# Patient Record
Sex: FEMALE | Race: WHITE | Employment: PART TIME | URBAN - METROPOLITAN AREA
[De-identification: names, ages, dates, MRNs, and addresses within clinical notes are randomized per-mention and may not be internally consistent; named-entity substitution may affect disease eponyms.]

---

## 2017-03-31 ENCOUNTER — OFFICE VISIT (OUTPATIENT)
Dept: INTERNAL MEDICINE | Age: 22
End: 2017-03-31
Payer: MEDICAID

## 2017-03-31 VITALS
DIASTOLIC BLOOD PRESSURE: 78 MMHG | SYSTOLIC BLOOD PRESSURE: 114 MMHG | WEIGHT: 138 LBS | BODY MASS INDEX: 26.07 KG/M2 | OXYGEN SATURATION: 100 % | HEART RATE: 92 BPM

## 2017-03-31 DIAGNOSIS — M25.842 CYST OF JOINT OF HAND, LEFT: Primary | ICD-10-CM

## 2017-03-31 DIAGNOSIS — Z30.09 BIRTH CONTROL COUNSELING: ICD-10-CM

## 2017-03-31 DIAGNOSIS — G44.019 EPISODIC CLUSTER HEADACHE, NOT INTRACTABLE: ICD-10-CM

## 2017-03-31 DIAGNOSIS — Z00.00 HEALTHCARE MAINTENANCE: ICD-10-CM

## 2017-03-31 PROCEDURE — 99205 OFFICE O/P NEW HI 60 MIN: CPT | Performed by: NURSE PRACTITIONER

## 2017-03-31 RX ORDER — ACETAMINOPHEN, ASPIRIN AND CAFFEINE 250; 250; 65 MG/1; MG/1; MG/1
2 TABLET, FILM COATED ORAL EVERY 6 HOURS PRN
Qty: 90 TABLET | Refills: 3 | COMMUNITY
Start: 2017-03-31 | End: 2017-11-16

## 2017-03-31 ASSESSMENT — ENCOUNTER SYMPTOMS
COUGH: 0
NAUSEA: 1
SINUS PRESSURE: 1
PHOTOPHOBIA: 1

## 2017-03-31 ASSESSMENT — PATIENT HEALTH QUESTIONNAIRE - PHQ9
1. LITTLE INTEREST OR PLEASURE IN DOING THINGS: 0
SUM OF ALL RESPONSES TO PHQ QUESTIONS 1-9: 0
2. FEELING DOWN, DEPRESSED OR HOPELESS: 0
SUM OF ALL RESPONSES TO PHQ9 QUESTIONS 1 & 2: 0

## 2017-11-16 ENCOUNTER — OFFICE VISIT (OUTPATIENT)
Dept: FAMILY MEDICINE CLINIC | Age: 22
End: 2017-11-16
Payer: MEDICAID

## 2017-11-16 VITALS
RESPIRATION RATE: 14 BRPM | HEART RATE: 87 BPM | WEIGHT: 146 LBS | BODY MASS INDEX: 27.56 KG/M2 | HEIGHT: 61 IN | SYSTOLIC BLOOD PRESSURE: 118 MMHG | OXYGEN SATURATION: 94 % | DIASTOLIC BLOOD PRESSURE: 82 MMHG

## 2017-11-16 DIAGNOSIS — Z76.89 ENCOUNTER TO ESTABLISH CARE: Primary | ICD-10-CM

## 2017-11-16 DIAGNOSIS — R22.2 LUMP OF SKIN OF BACK: ICD-10-CM

## 2017-11-16 DIAGNOSIS — G89.29 CHRONIC MIDLINE LOW BACK PAIN WITHOUT SCIATICA: ICD-10-CM

## 2017-11-16 DIAGNOSIS — M25.562 PAIN IN LATERAL PORTION OF LEFT KNEE: ICD-10-CM

## 2017-11-16 DIAGNOSIS — M54.50 CHRONIC MIDLINE LOW BACK PAIN WITHOUT SCIATICA: ICD-10-CM

## 2017-11-16 LAB
CONTROL: NEGATIVE
PREGNANCY TEST URINE, POC: NEGATIVE

## 2017-11-16 PROCEDURE — 1036F TOBACCO NON-USER: CPT | Performed by: FAMILY MEDICINE

## 2017-11-16 PROCEDURE — G8419 CALC BMI OUT NRM PARAM NOF/U: HCPCS | Performed by: FAMILY MEDICINE

## 2017-11-16 PROCEDURE — G8427 DOCREV CUR MEDS BY ELIG CLIN: HCPCS | Performed by: FAMILY MEDICINE

## 2017-11-16 PROCEDURE — 99204 OFFICE O/P NEW MOD 45 MIN: CPT | Performed by: FAMILY MEDICINE

## 2017-11-16 PROCEDURE — 81025 URINE PREGNANCY TEST: CPT | Performed by: FAMILY MEDICINE

## 2017-11-16 PROCEDURE — G8484 FLU IMMUNIZE NO ADMIN: HCPCS | Performed by: FAMILY MEDICINE

## 2017-11-16 RX ORDER — NAPROXEN 500 MG/1
500 TABLET ORAL 2 TIMES DAILY PRN
Qty: 60 TABLET | Refills: 0 | Status: ON HOLD | OUTPATIENT
Start: 2017-11-16 | End: 2018-02-16 | Stop reason: HOSPADM

## 2017-11-16 ASSESSMENT — ENCOUNTER SYMPTOMS
BACK PAIN: 1
SHORTNESS OF BREATH: 0
BLOOD IN STOOL: 0
EYE PAIN: 0

## 2017-11-16 NOTE — PROGRESS NOTES
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    History of frequent ear infections as a child    Vision abnormalities      Past Surgical History:   Procedure Laterality Date    CARDIAC SURGERY      hole in heart at birth   [place]  SECTION      2x    DENTAL SURGERY      wisdom teeth     Family History   Problem Relation Age of Onset    Breast Cancer Other     Hypertension Maternal Grandmother     Cancer Maternal Grandmother      tongue    Diabetes Mother     Hypertension Mother     Breast Cancer Maternal Aunt     Heart Disease Son     Colon Cancer Neg Hx     Eclampsia Neg Hx     Ovarian Cancer Neg Hx      Labor Neg Hx     Spont Abortions Neg Hx     Stroke Neg Hx      Social History   Substance Use Topics    Smoking status: Never Smoker    Smokeless tobacco: Never Used    Alcohol use No       Current Outpatient Prescriptions:     naproxen (EC-NAPROSYN) 500 MG EC tablet, Take 1 tablet by mouth 2 times daily as needed for Pain, Disp: 60 tablet, Rfl: 0    Subjective:     Review of Systems   Constitutional: Negative for appetite change, diaphoresis, fever and unexpected weight change. HENT: Negative for ear pain. Eyes: Negative for pain. Respiratory: Negative for shortness of breath. Cardiovascular: Negative for chest pain and leg swelling. Gastrointestinal: Negative for blood in stool. Musculoskeletal: Positive for arthralgias and back pain. Psychiatric/Behavioral: Negative for dysphoric mood and suicidal ideas. Objective:     /82   Pulse 87   Resp 14   Ht 5' 1\" (1.549 m)   Wt 146 lb (66.2 kg)   LMP 10/28/2017   SpO2 94%   Breastfeeding? No   BMI 27.59 kg/m²     Physical Exam   Constitutional: She is oriented to person, place, and time. She appears well-developed.  No distress. HENT:   Head: Normocephalic. Mouth/Throat: Oropharynx is clear and moist.   Eyes: Conjunctivae and EOM are normal.   Neck: Normal range of motion. Neck supple. No thyromegaly present. Cardiovascular: Normal rate, regular rhythm and normal heart sounds. No murmur heard. Pulmonary/Chest: Effort normal and breath sounds normal. No respiratory distress. She has no wheezes. Abdominal: Soft. There is no rebound and no guarding. Musculoskeletal: She exhibits no edema or deformity. Left knee: She exhibits abnormal patellar mobility. She exhibits normal range of motion, no swelling, no effusion, no LCL laxity, no bony tenderness and no MCL laxity. Tenderness on palpation over lateral patella, patella hypermobile   Lymphadenopathy:     She has no cervical adenopathy. Neurological: She is alert and oriented to person, place, and time. Skin: Lesion noted. She is not diaphoretic. R lower back: 2 cm x 3 cm soft, non-tender, non-mobile subcutaneous lump, no overlying skin changes   Psychiatric: She has a normal mood and affect. Her behavior is normal. Judgment and thought content normal.       Assessment & Plan:      1. Encounter to establish care  - CBC Auto Differential; Future  - Comprehensive Metabolic Panel; Future  - Ferritin; Future  - Lipid Panel; Future  - TSH with Reflex; Future  - Vitamin B12; Future  - HCG, Quantitative, Pregnancy; Future    2. Lump of skin of back  The lump on her right lower back seems to be related to a cyst or lipoma. Given the patient's concern and ultrasound and x-ray have been ordered. - US ABDOMEN LIMITED; Future  - XR LUMBAR SPINE (2-3 VIEWS); Future    3. Chronic midline low back pain without sciatica  - naproxen (EC-NAPROSYN) 500 MG EC tablet; Take 1 tablet by mouth 2 times daily as needed for Pain  Dispense: 60 tablet; Refill: 0  - [facility]    4.  Pain in lateral portion of left knee  Locking in her left knee maybe related to meniscal tear. I recommended physical therapy. Rx is given for naproxen. An MRI of the left knee has been ordered. She will follow-up with me after her MRI. - naproxen (EC-NAPROSYN) 500 MG EC tablet; Take 1 tablet by mouth 2 times daily as needed for Pain  Dispense: 60 tablet; Refill: 0  - [facility] [place]  - MRI KNEE LEFT WO CONTRAST; Future    5. Contraception  Urine preg test neg today. Ordered serum BHCG with screening labwork. Pt will be seeing her gyn at the end of the month for pap and to discuss the Nexplanon. Discussed abstinence or condom use until her gyn appmt if she wishes to avoid pregnancy.     Flu shot declined    Electronically signed by Rigoberto Musa MD on 11/16/2017 at 1:44 PM

## 2017-11-17 LAB
ALBUMIN SERPL-MCNC: NORMAL G/DL
ALP BLD-CCNC: NORMAL U/L
ALT SERPL-CCNC: NORMAL U/L
ANION GAP SERPL CALCULATED.3IONS-SCNC: NORMAL MMOL/L
AST SERPL-CCNC: NORMAL U/L
BASOPHILS ABSOLUTE: NORMAL /ΜL
BASOPHILS RELATIVE PERCENT: NORMAL %
BILIRUB SERPL-MCNC: NORMAL MG/DL (ref 0.1–1.4)
BUN BLDV-MCNC: NORMAL MG/DL
CALCIUM SERPL-MCNC: NORMAL MG/DL
CHLORIDE BLD-SCNC: NORMAL MMOL/L
CHOLESTEROL, TOTAL: 201 MG/DL
CHOLESTEROL/HDL RATIO: 6.1
CO2: NORMAL MMOL/L
CREAT SERPL-MCNC: NORMAL MG/DL
EOSINOPHILS ABSOLUTE: NORMAL /ΜL
EOSINOPHILS RELATIVE PERCENT: NORMAL %
FERRITIN: NORMAL NG/ML (ref 9–150)
GFR CALCULATED: NORMAL
GLUCOSE BLD-MCNC: NORMAL MG/DL
HCT VFR BLD CALC: NORMAL % (ref 36–46)
HDLC SERPL-MCNC: 33 MG/DL (ref 35–70)
HEMOGLOBIN: NORMAL G/DL (ref 12–16)
LDL CHOLESTEROL CALCULATED: 138 MG/DL (ref 0–160)
LYMPHOCYTES ABSOLUTE: NORMAL /ΜL
LYMPHOCYTES RELATIVE PERCENT: NORMAL %
MCH RBC QN AUTO: NORMAL PG
MCHC RBC AUTO-ENTMCNC: NORMAL G/DL
MCV RBC AUTO: NORMAL FL
MONOCYTES ABSOLUTE: NORMAL /ΜL
MONOCYTES RELATIVE PERCENT: NORMAL %
NEUTROPHILS ABSOLUTE: NORMAL /ΜL
NEUTROPHILS RELATIVE PERCENT: NORMAL %
PDW BLD-RTO: NORMAL %
PLATELET # BLD: NORMAL K/ΜL
PMV BLD AUTO: NORMAL FL
POTASSIUM SERPL-SCNC: NORMAL MMOL/L
RBC # BLD: NORMAL 10^6/ΜL
SODIUM BLD-SCNC: NORMAL MMOL/L
TOTAL PROTEIN: NORMAL
TRIGL SERPL-MCNC: 167 MG/DL
TSH SERPL DL<=0.05 MIU/L-ACNC: NORMAL UIU/ML
VITAMIN B-12: NORMAL
VLDLC SERPL CALC-MCNC: ABNORMAL MG/DL
WBC # BLD: NORMAL 10^3/ML

## 2017-11-20 ENCOUNTER — TELEPHONE (OUTPATIENT)
Dept: FAMILY MEDICINE CLINIC | Age: 22
End: 2017-11-20

## 2017-11-20 NOTE — TELEPHONE ENCOUNTER
Pt would like to know results of bloodwork, which was drawn downstairs on 11/16/17. Nothing in chart yet. I told her we would have to call and get them faxed before we would be able to give her a call back with the results.

## 2017-11-21 DIAGNOSIS — Z76.89 ENCOUNTER TO ESTABLISH CARE: ICD-10-CM

## 2017-12-14 ENCOUNTER — OFFICE VISIT (OUTPATIENT)
Dept: OBGYN CLINIC | Age: 22
End: 2017-12-14
Payer: MEDICAID

## 2017-12-14 ENCOUNTER — HOSPITAL ENCOUNTER (OUTPATIENT)
Age: 22
Setting detail: SPECIMEN
Discharge: HOME | End: 2017-12-14
Payer: MEDICAID

## 2017-12-14 VITALS
SYSTOLIC BLOOD PRESSURE: 121 MMHG | HEIGHT: 61 IN | HEART RATE: 92 BPM | DIASTOLIC BLOOD PRESSURE: 72 MMHG | WEIGHT: 138 LBS | BODY MASS INDEX: 26.06 KG/M2

## 2017-12-14 DIAGNOSIS — Z01.419 ENCOUNTER FOR WELL WOMAN EXAM WITH ROUTINE GYNECOLOGICAL EXAM: Primary | ICD-10-CM

## 2017-12-14 PROCEDURE — 99385 PREV VISIT NEW AGE 18-39: CPT | Performed by: ADVANCED PRACTICE MIDWIFE

## 2017-12-14 NOTE — PROGRESS NOTES
Subjective:  Sunil Reid is a 23 y.o. female who presents for an annual exam.     HPI:  The patient has no complaints today. Desires contraception, discussed options and questions answered. Will go w Nexplanon. Aware will schedule w cycle and no intercourse to assure no pregnancy. Preventative Health Screening:  The patient is sexually active. No new partners. last pap: was abnormal: LSIL       HPV typing: unknown  Date   Regular exercise: no Type:not regular, chases kids, frequency: not regular, Explored exercise options no  Ever been transfused or tattooed?no  The patient reports that domestic violence in her life is absent  Wears seatbelts: yes  Smoking: no  Alcohol use: no  Recreational drug use: no  Happy with life: yes, satisfied with how she handles stress: yes most of the time  Family planning choices: desires Nexplanon  Desires pregnancy in the next year: no  BRCA testing: no  Date of last DEXA:no  Date of last Mammogram: no  Colonscopy screening completed: no  History of gestational diabetes:   no   Weight management: no issues  Diet review: no    Gynecological history:  OB History    Para Term  AB Living   3 2 2 0 1 2   SAB TAB Ectopic Molar Multiple Live Births   1 0 0   0 2      # Outcome Date GA Lbr Marciano/2nd Weight Sex Delivery Anes PTL Lv   3 Term 10/19/15   5 lb 2 oz (2.325 kg) M CS-Unspec   ROCIO   2 SAB  4w0d          1 Term  39w3d  6 lb 15 oz (3.147 kg) F CS-LTranv   ROCIO          Menarche age: 13      Cycle regular yes, days 28-30,, flow 5 days, moderate   Patient's last menstrual period was 2017. Menopause age: no              Patient's medications, allergies, past medical, surgical, social and family histories were reviewed and updated as appropriate. Hormone Replacement: no    STD history: Chl years ago      Review of Systems:  Constitutional: No fever, chills or malaise. No unexplained weight changes. Head and eyes: No headache, dizziness or trauma.  No Normal female external genatalia without lesion, discharge or tenderness. BUS is normal. Vagina is pink with rugae present No cervical motion tenderness. Uterus mobile and of normal size. Adnexa non tender. Kegel tone strong   Rectal:    No external hemorrhoids noted. Extremities:   Extremities normal, atraumatic, no cyanosis or edema   Pulses:   2+ and symmetric all extremities   Skin:   Skin color, texture, turgor normal, no rashes or lesions   Lymph nodes:   Cervical, supraclavicular, and axillary nodes normal   Neurologic:   Normal strength, sensation and reflexes     throughout       Assessment:  Healthy female exam    Plan:  Return for annual exam. Pap as guideline recommends.    Preventative health maintenance: continue w condoms  Mammogram no  DEXA scan: no  Colonscopy: no

## 2017-12-27 LAB — CYTOLOGY REPORT: NORMAL

## 2017-12-28 NOTE — PROGRESS NOTES
Called and left message on VM that she needs co,lposcopy. Brief description of procedure that Dr Rosemarie Esteban will do. Left Dr Aguilera Pluck name and the phone number for her to schedule. Advised her to schedule inn the next few weeks.

## 2018-01-02 ENCOUNTER — TELEPHONE (OUTPATIENT)
Dept: OBGYN CLINIC | Age: 23
End: 2018-01-02

## 2018-01-04 ENCOUNTER — PROCEDURE VISIT (OUTPATIENT)
Dept: OBGYN CLINIC | Age: 23
End: 2018-01-04
Payer: MEDICAID

## 2018-01-04 ENCOUNTER — HOSPITAL ENCOUNTER (OUTPATIENT)
Age: 23
Setting detail: SPECIMEN
Discharge: HOME | End: 2018-01-04
Payer: MEDICAID

## 2018-01-04 VITALS
SYSTOLIC BLOOD PRESSURE: 115 MMHG | HEIGHT: 62 IN | BODY MASS INDEX: 26.13 KG/M2 | WEIGHT: 142 LBS | DIASTOLIC BLOOD PRESSURE: 74 MMHG | HEART RATE: 89 BPM

## 2018-01-04 DIAGNOSIS — R87.613 HGSIL (HIGH GRADE SQUAMOUS INTRAEPITHELIAL LESION) ON PAP SMEAR OF CERVIX: ICD-10-CM

## 2018-01-04 DIAGNOSIS — N91.2 AMENORRHEA: Primary | ICD-10-CM

## 2018-01-04 LAB
CONTROL: PRESENT
PREGNANCY TEST URINE, POC: NEGATIVE

## 2018-01-04 PROCEDURE — 57456 ENDOCERV CURETTAGE W/SCOPE: CPT | Performed by: OBSTETRICS & GYNECOLOGY

## 2018-01-04 PROCEDURE — 81025 URINE PREGNANCY TEST: CPT | Performed by: OBSTETRICS & GYNECOLOGY

## 2018-01-04 NOTE — PROGRESS NOTES
COLPOSCOPY PROCEDURE FORM    1/4/2018  Susu Garcia  Patient's last menstrual period was 11/28/2017.  23 y.o. History   Smoking Status    Never Smoker   Smokeless Tobacco    Never Used         INDICATIONS: HGSIL               UHCG: negative         HPV:   na      Birth Control Method: none  Abnormal Cytology and Colposcopy History: LGSIL 2014    COLPOSCOPIC EXAMINATION:                Blood pressure 115/74, pulse 89, height 5' 1.5\" (1.562 m), weight 142 lb (64.4 kg), last menstrual period 11/28/2017, not currently breastfeeding. Gross observations: normal tz  Satisfactory: Yes   Unsatisfactory: No    LANDMARKS and ATYPICAL FINDINGS:  TZ = transformation zone   SC = new squamocolumnar junction  NC = Nabothian cyst   ME = immature squamous metaplasia  PO = polyp   AV = atypical vessels  C = condyloma  L = leukoplakia  AW = acetowhite epithelium   P = punctation  MO = mosaicism   LS = decreased Lugols uptake  X = biopsy sites  CA = invasive carcinoma    FINDINGS: normal appearing tz     ECC performed:  Yes    Lugols Iodine Applied:   No       IMPRESSIONS: normal colpo  Biopsy sites: ecc-if normal pap in six months, if abnormal LEEP    PLAN: The patient was given formal restriction guidelines. She is to RTO in 2 weeks. FOR PATIENTS WHO UNDERWENT A BIOPSY-They were instructed to report any increase in vaginal bleeding, discharge, or any temperature more than 100.4 F. Strict pelvic rest precautions were reviewed with lifting restrictions.

## 2018-01-05 ENCOUNTER — TELEPHONE (OUTPATIENT)
Dept: OBGYN CLINIC | Age: 23
End: 2018-01-05

## 2018-01-05 NOTE — TELEPHONE ENCOUNTER
Delano Lowery called concerned about the Nexplanon and is on a chat site that a few people have told her that they had to have there's surgically removed. I advised her to do a little more research on web Md and [address] I asked her to keep her appointment on the 19 with Brigida and if she still does not want Nexplanon we would do what ever she decides.

## 2018-01-08 LAB — SURGICAL PATHOLOGY REPORT: NORMAL

## 2018-01-09 ENCOUNTER — TELEPHONE (OUTPATIENT)
Dept: OBGYN CLINIC | Age: 23
End: 2018-01-09

## 2018-01-11 ENCOUNTER — OFFICE VISIT (OUTPATIENT)
Dept: OBGYN CLINIC | Age: 23
End: 2018-01-11
Payer: MEDICAID

## 2018-01-11 VITALS
HEART RATE: 90 BPM | HEIGHT: 62 IN | WEIGHT: 142 LBS | SYSTOLIC BLOOD PRESSURE: 132 MMHG | DIASTOLIC BLOOD PRESSURE: 79 MMHG | BODY MASS INDEX: 26.13 KG/M2

## 2018-01-11 DIAGNOSIS — R87.613 HGSIL (HIGH GRADE SQUAMOUS INTRAEPITHELIAL LESION) ON PAP SMEAR OF CERVIX: ICD-10-CM

## 2018-01-11 PROCEDURE — G8484 FLU IMMUNIZE NO ADMIN: HCPCS | Performed by: OBSTETRICS & GYNECOLOGY

## 2018-01-11 PROCEDURE — 99212 OFFICE O/P EST SF 10 MIN: CPT | Performed by: OBSTETRICS & GYNECOLOGY

## 2018-01-11 PROCEDURE — 1036F TOBACCO NON-USER: CPT | Performed by: OBSTETRICS & GYNECOLOGY

## 2018-01-11 PROCEDURE — G8427 DOCREV CUR MEDS BY ELIG CLIN: HCPCS | Performed by: OBSTETRICS & GYNECOLOGY

## 2018-01-11 PROCEDURE — G8419 CALC BMI OUT NRM PARAM NOF/U: HCPCS | Performed by: OBSTETRICS & GYNECOLOGY

## 2018-01-11 NOTE — PROGRESS NOTES
Blood pressure 132/79, pulse 90, height 5' 1.5\" (1.562 m), weight 142 lb (64.4 kg), last menstrual period 01/04/2018, not currently breastfeeding. Pelvic: Exam deferred. Assessment:  1. HGSIL (high grade squamous intraepithelial lesion) on Pap smear of cervix         PLAN:  LEEP with Tophat  Answered questions regarding HPV  Return to the office in postop weeks. Counseled on preventative health maintenance follow-up. Patient was seen with total face to face time of 10 minutes. More than 50% of this visit was counseling and education regarding The encounter diagnosis was HGSIL (high grade squamous intraepithelial lesion) on Pap smear of cervix. and Results ([place] results)   as well as  counseling on preventative health maintenance follow-up.

## 2018-02-14 PROBLEM — N87.9 CERVICAL INTRAEPITHELIAL NEOPLASIA: Status: ACTIVE | Noted: 2018-02-14

## 2018-02-14 PROBLEM — R87.619 ABNORMAL PAP SMEAR OF CERVIX: Status: ACTIVE | Noted: 2018-02-14

## 2018-02-14 NOTE — H&P
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 Terri Tracy DO  Ob/Gyn Resident  Pager: [phone]  2/16/2018, 7:22 AM

## 2018-02-15 ENCOUNTER — ANESTHESIA EVENT (OUTPATIENT)
Dept: OPERATING ROOM | Age: 23
End: 2018-02-15
Payer: MEDICAID

## 2018-02-16 ENCOUNTER — ANESTHESIA (OUTPATIENT)
Dept: OPERATING ROOM | Age: 23
End: 2018-02-16
Payer: MEDICAID

## 2018-02-16 ENCOUNTER — HOSPITAL ENCOUNTER (OUTPATIENT)
Age: 23
Setting detail: OUTPATIENT SURGERY
Discharge: HOME | End: 2018-02-16
Attending: OBSTETRICS & GYNECOLOGY | Admitting: OBSTETRICS & GYNECOLOGY
Payer: MEDICAID

## 2018-02-16 VITALS
HEIGHT: 61 IN | WEIGHT: 143.96 LBS | BODY MASS INDEX: 27.18 KG/M2 | RESPIRATION RATE: 21 BRPM | OXYGEN SATURATION: 100 % | HEART RATE: 83 BPM | SYSTOLIC BLOOD PRESSURE: 104 MMHG | DIASTOLIC BLOOD PRESSURE: 65 MMHG | TEMPERATURE: 98.4 F

## 2018-02-16 VITALS — SYSTOLIC BLOOD PRESSURE: 103 MMHG | DIASTOLIC BLOOD PRESSURE: 48 MMHG | OXYGEN SATURATION: 100 %

## 2018-02-16 PROBLEM — Z98.890 S/P LEEP: Status: ACTIVE | Noted: 2018-02-16

## 2018-02-16 LAB — HCG, PREGNANCY URINE (POC): NEGATIVE

## 2018-02-16 PROCEDURE — 88342 IMHCHEM/IMCYTCHM 1ST ANTB: CPT

## 2018-02-16 PROCEDURE — 2500000003 HC RX 250 WO HCPCS: Performed by: ANESTHESIOLOGY

## 2018-02-16 PROCEDURE — 3700000000 HC ANESTHESIA ATTENDED CARE: Performed by: OBSTETRICS & GYNECOLOGY

## 2018-02-16 PROCEDURE — 2580000003 HC RX 258: Performed by: OBSTETRICS & GYNECOLOGY

## 2018-02-16 PROCEDURE — 88305 TISSUE EXAM BY PATHOLOGIST: CPT

## 2018-02-16 PROCEDURE — 6360000002 HC RX W HCPCS: Performed by: ANESTHESIOLOGY

## 2018-02-16 PROCEDURE — 3600000013 HC SURGERY LEVEL 3 ADDTL 15MIN: Performed by: OBSTETRICS & GYNECOLOGY

## 2018-02-16 PROCEDURE — 6370000000 HC RX 637 (ALT 250 FOR IP)

## 2018-02-16 PROCEDURE — 88307 TISSUE EXAM BY PATHOLOGIST: CPT

## 2018-02-16 PROCEDURE — 3600000003 HC SURGERY LEVEL 3 BASE: Performed by: OBSTETRICS & GYNECOLOGY

## 2018-02-16 PROCEDURE — 7100000010 HC PHASE II RECOVERY - FIRST 15 MIN: Performed by: OBSTETRICS & GYNECOLOGY

## 2018-02-16 PROCEDURE — 7100000000 HC PACU RECOVERY - FIRST 15 MIN: Performed by: OBSTETRICS & GYNECOLOGY

## 2018-02-16 PROCEDURE — 3700000001 HC ADD 15 MINUTES (ANESTHESIA): Performed by: OBSTETRICS & GYNECOLOGY

## 2018-02-16 PROCEDURE — 6360000002 HC RX W HCPCS: Performed by: NURSE ANESTHETIST, CERTIFIED REGISTERED

## 2018-02-16 PROCEDURE — 7100000001 HC PACU RECOVERY - ADDTL 15 MIN: Performed by: OBSTETRICS & GYNECOLOGY

## 2018-02-16 PROCEDURE — 57522 CONIZATION OF CERVIX: CPT | Performed by: OBSTETRICS & GYNECOLOGY

## 2018-02-16 PROCEDURE — 2580000003 HC RX 258: Performed by: NURSE ANESTHETIST, CERTIFIED REGISTERED

## 2018-02-16 PROCEDURE — 2580000003 HC RX 258: Performed by: ANESTHESIOLOGY

## 2018-02-16 PROCEDURE — 84703 CHORIONIC GONADOTROPIN ASSAY: CPT

## 2018-02-16 RX ORDER — MAGNESIUM HYDROXIDE 1200 MG/15ML
LIQUID ORAL CONTINUOUS PRN
Status: DISCONTINUED | OUTPATIENT
Start: 2018-02-16 | End: 2018-02-16 | Stop reason: HOSPADM

## 2018-02-16 RX ORDER — FENTANYL CITRATE 50 UG/ML
25 INJECTION, SOLUTION INTRAMUSCULAR; INTRAVENOUS EVERY 5 MIN PRN
Status: DISCONTINUED | OUTPATIENT
Start: 2018-02-16 | End: 2018-02-16 | Stop reason: HOSPADM

## 2018-02-16 RX ORDER — PYRANTEL PAMOATE 100 %
POWDER (GRAM) MISCELLANEOUS PRN
Status: DISCONTINUED | OUTPATIENT
Start: 2018-02-16 | End: 2018-02-16 | Stop reason: HOSPADM

## 2018-02-16 RX ORDER — SODIUM CHLORIDE, SODIUM LACTATE, POTASSIUM CHLORIDE, CALCIUM CHLORIDE 600; 310; 30; 20 MG/100ML; MG/100ML; MG/100ML; MG/100ML
INJECTION, SOLUTION INTRAVENOUS CONTINUOUS
Status: DISCONTINUED | OUTPATIENT
Start: 2018-02-16 | End: 2018-02-16 | Stop reason: HOSPADM

## 2018-02-16 RX ORDER — PROPOFOL 10 MG/ML
INJECTION, EMULSION INTRAVENOUS PRN
Status: DISCONTINUED | OUTPATIENT
Start: 2018-02-16 | End: 2018-02-16 | Stop reason: SDUPTHER

## 2018-02-16 RX ORDER — SODIUM CHLORIDE 0.9 % (FLUSH) 0.9 %
10 SYRINGE (ML) INJECTION PRN
Status: DISCONTINUED | OUTPATIENT
Start: 2018-02-16 | End: 2018-02-16 | Stop reason: HOSPADM

## 2018-02-16 RX ORDER — MIDAZOLAM HYDROCHLORIDE 1 MG/ML
1 INJECTION INTRAMUSCULAR; INTRAVENOUS EVERY 5 MIN PRN
Status: DISCONTINUED | OUTPATIENT
Start: 2018-02-16 | End: 2018-02-16 | Stop reason: HOSPADM

## 2018-02-16 RX ORDER — FENTANYL CITRATE 50 UG/ML
50 INJECTION, SOLUTION INTRAMUSCULAR; INTRAVENOUS EVERY 5 MIN PRN
Status: DISCONTINUED | OUTPATIENT
Start: 2018-02-16 | End: 2018-02-16 | Stop reason: HOSPADM

## 2018-02-16 RX ORDER — SODIUM CHLORIDE 0.9 % (FLUSH) 0.9 %
10 SYRINGE (ML) INJECTION EVERY 12 HOURS SCHEDULED
Status: DISCONTINUED | OUTPATIENT
Start: 2018-02-16 | End: 2018-02-16 | Stop reason: HOSPADM

## 2018-02-16 RX ORDER — IBUPROFEN 800 MG/1
800 TABLET ORAL EVERY 8 HOURS PRN
Qty: 30 TABLET | Refills: 0 | Status: SHIPPED | OUTPATIENT
Start: 2018-02-16 | End: 2018-05-30 | Stop reason: ALTCHOICE

## 2018-02-16 RX ORDER — SODIUM CHLORIDE, SODIUM LACTATE, POTASSIUM CHLORIDE, CALCIUM CHLORIDE 600; 310; 30; 20 MG/100ML; MG/100ML; MG/100ML; MG/100ML
INJECTION, SOLUTION INTRAVENOUS CONTINUOUS PRN
Status: DISCONTINUED | OUTPATIENT
Start: 2018-02-16 | End: 2018-02-16 | Stop reason: SDUPTHER

## 2018-02-16 RX ORDER — ONDANSETRON 2 MG/ML
4 INJECTION INTRAMUSCULAR; INTRAVENOUS
Status: COMPLETED | OUTPATIENT
Start: 2018-02-16 | End: 2018-02-16

## 2018-02-16 RX ORDER — LIDOCAINE HYDROCHLORIDE 10 MG/ML
1 INJECTION, SOLUTION EPIDURAL; INFILTRATION; INTRACAUDAL; PERINEURAL
Status: COMPLETED | OUTPATIENT
Start: 2018-02-16 | End: 2018-02-16

## 2018-02-16 RX ORDER — DEXAMETHASONE SODIUM PHOSPHATE 10 MG/ML
INJECTION INTRAMUSCULAR; INTRAVENOUS PRN
Status: DISCONTINUED | OUTPATIENT
Start: 2018-02-16 | End: 2018-02-16 | Stop reason: SDUPTHER

## 2018-02-16 RX ADMIN — MIDAZOLAM HYDROCHLORIDE 2 MG: 1 INJECTION, SOLUTION INTRAMUSCULAR; INTRAVENOUS at 08:49

## 2018-02-16 RX ADMIN — PROPOFOL 180 MG: 10 INJECTION, EMULSION INTRAVENOUS at 08:51

## 2018-02-16 RX ADMIN — ONDANSETRON 4 MG: 2 INJECTION, SOLUTION INTRAMUSCULAR; INTRAVENOUS at 08:57

## 2018-02-16 RX ADMIN — SODIUM CHLORIDE, POTASSIUM CHLORIDE, SODIUM LACTATE AND CALCIUM CHLORIDE: 600; 310; 30; 20 INJECTION, SOLUTION INTRAVENOUS at 08:46

## 2018-02-16 RX ADMIN — DEXAMETHASONE SODIUM PHOSPHATE 10 MG: 10 INJECTION INTRAMUSCULAR; INTRAVENOUS at 08:57

## 2018-02-16 RX ADMIN — LIDOCAINE HYDROCHLORIDE 50 MG: 10 INJECTION, SOLUTION EPIDURAL; INFILTRATION; INTRACAUDAL; PERINEURAL at 08:51

## 2018-02-16 RX ADMIN — FENTANYL CITRATE 50 MCG: 50 INJECTION INTRAMUSCULAR; INTRAVENOUS at 09:00

## 2018-02-16 RX ADMIN — SODIUM CHLORIDE, POTASSIUM CHLORIDE, SODIUM LACTATE AND CALCIUM CHLORIDE: 600; 310; 30; 20 INJECTION, SOLUTION INTRAVENOUS at 07:14

## 2018-02-16 RX ADMIN — FENTANYL CITRATE 50 MCG: 50 INJECTION INTRAMUSCULAR; INTRAVENOUS at 08:51

## 2018-02-16 ASSESSMENT — PULMONARY FUNCTION TESTS
PIF_VALUE: 19
PIF_VALUE: 15
PIF_VALUE: 18
PIF_VALUE: 11
PIF_VALUE: 1
PIF_VALUE: 1
PIF_VALUE: 3
PIF_VALUE: 14
PIF_VALUE: 24
PIF_VALUE: 11
PIF_VALUE: 3
PIF_VALUE: 14
PIF_VALUE: 22
PIF_VALUE: 16
PIF_VALUE: 18

## 2018-02-16 ASSESSMENT — PAIN - FUNCTIONAL ASSESSMENT: PAIN_FUNCTIONAL_ASSESSMENT: 0-10

## 2018-02-16 ASSESSMENT — ENCOUNTER SYMPTOMS: STRIDOR: 0

## 2018-02-16 NOTE — ANESTHESIA PRE PROCEDURE
Department of Anesthesiology  Preprocedure Note       Name:  Sahmar Ravi   Age:  20 y.o.  :  1995                                          MRN:  [id number]         Date:  2018      Surgeon: Rekha Ahumada):  Dennis Falk MD    Procedure: Procedure(s):  LEEP WITH TOP HAT    Medications prior to admission:   Prior to Admission medications    Medication Sig Start Date End Date Taking? Authorizing Provider   ibuprofen (ADVIL;MOTRIN) 800 MG tablet Take 1 tablet by mouth every 8 hours as needed for Pain 18  Yes See-Yin So, DO       Current medications:    Current Facility-Administered Medications   Medication Dose Route Frequency Provider Last Rate Last Dose    lactated ringers infusion   Intravenous Continuous Francisco Morales  mL/hr at 18 5989      sodium chloride flush 0.9 % injection 10 mL  10 mL Intravenous 2 times per day Francisco Morales MD        sodium chloride flush 0.9 % injection 10 mL  10 mL Intravenous PRN Francisco Morales MD        lidocaine PF 1 % injection 1 mL  1 mL Intradermal Once PRN Francisco Morales MD           Allergies:  No Known Allergies    Problem List:    Patient Active Problem List   Diagnosis Code    Congenital heart defect Q24.9    h/o Chlamydia infection this pregnancy O98.319, A74.9    Circumvallate placenta O43. [facility] abnormalities H53.9    Rh+/RI/GBS- O09.90    CMV exposure complicating pregnancy H21.81, Z20.828, Z3A.00    PLTCS 14 F Apg 8/ Wt 6#15 Z98.891    Chronic midline low back pain without sciatica M54.5, G89.29    Pain in lateral portion of left knee M25.562    Lump of skin of back R22.2    HGSIL (high grade squamous intraepithelial lesion) on Pap smear of cervix R87.613    Abnormal Pap smear of cervix R87.619    ANU 2-3 N87.9       Past Medical History:        Diagnosis Date    Chronic midline low back pain without sciatica 2017    Congenital heart defect     born w/ VSD, 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   [facility]

## 2018-02-16 NOTE — ANESTHESIA POSTPROCEDURE EVALUATION
Department of Anesthesiology  Postprocedure Note    Patient: Keshav Gonzalez  MRN: [id number]  YOB: 1995  Date of evaluation: 2/16/2018  Time:  12:09 PM     Procedure Summary     Date:  02/16/18 Room / Location:  [address] OR    Anesthesia Start:  0848 Anesthesia Stop:  2724    Procedure:  LEEP WITH TOP HAT (N/A ) Diagnosis:  (HGSIL )    Surgeon:  Abad Canela MD Responsible Provider:  Candice Courtney MD    Anesthesia Type:  general ASA Status:  2          Anesthesia Type: general    Elliot Phase I: Elliot Score: 10    Elliot Phase II: Elliot Score: 10    Last vitals: Reviewed and per EMR flowsheets.        Anesthesia Post Evaluation    Patient location during evaluation: PACU  Patient participation: complete - patient participated  Level of consciousness: awake  Pain score: 2  Airway patency: patent  Nausea & Vomiting: no nausea  Complications: no  Cardiovascular status: hemodynamically stable  Respiratory status: spontaneous ventilation  Hydration status: stable

## 2018-02-19 LAB — SURGICAL PATHOLOGY REPORT: NORMAL

## 2018-03-07 ENCOUNTER — OFFICE VISIT (OUTPATIENT)
Dept: OBGYN CLINIC | Age: 23
End: 2018-03-07

## 2018-03-07 VITALS
HEIGHT: 61 IN | SYSTOLIC BLOOD PRESSURE: 119 MMHG | WEIGHT: 144 LBS | BODY MASS INDEX: 27.19 KG/M2 | TEMPERATURE: 97.2 F | DIASTOLIC BLOOD PRESSURE: 77 MMHG | HEART RATE: 89 BPM

## 2018-03-07 DIAGNOSIS — R87.613 HGSIL (HIGH GRADE SQUAMOUS INTRAEPITHELIAL LESION) ON PAP SMEAR OF CERVIX: Primary | ICD-10-CM

## 2018-03-07 PROCEDURE — 99024 POSTOP FOLLOW-UP VISIT: CPT | Performed by: OBSTETRICS & GYNECOLOGY

## 2018-03-07 RX ORDER — NORGESTIMATE AND ETHINYL ESTRADIOL 0.25-0.035
1 KIT ORAL DAILY
Qty: 1 PACKET | Refills: 7 | Status: SHIPPED | OUTPATIENT
Start: 2018-03-07 | End: 2018-06-06

## 2018-04-19 ENCOUNTER — OFFICE VISIT (OUTPATIENT)
Dept: OBGYN CLINIC | Age: 23
End: 2018-04-19

## 2018-04-19 VITALS
SYSTOLIC BLOOD PRESSURE: 116 MMHG | HEIGHT: 61 IN | BODY MASS INDEX: 26.06 KG/M2 | DIASTOLIC BLOOD PRESSURE: 77 MMHG | HEART RATE: 93 BPM | WEIGHT: 138 LBS

## 2018-04-19 DIAGNOSIS — N93.9 VAGINAL BLEEDING: Primary | ICD-10-CM

## 2018-04-19 PROCEDURE — 99024 POSTOP FOLLOW-UP VISIT: CPT | Performed by: OBSTETRICS & GYNECOLOGY

## 2018-04-19 PROCEDURE — G8419 CALC BMI OUT NRM PARAM NOF/U: HCPCS | Performed by: OBSTETRICS & GYNECOLOGY

## 2018-04-19 PROCEDURE — 1036F TOBACCO NON-USER: CPT | Performed by: OBSTETRICS & GYNECOLOGY

## 2018-04-19 PROCEDURE — G8427 DOCREV CUR MEDS BY ELIG CLIN: HCPCS | Performed by: OBSTETRICS & GYNECOLOGY

## 2018-05-30 ENCOUNTER — OFFICE VISIT (OUTPATIENT)
Dept: OBGYN CLINIC | Age: 23
End: 2018-05-30
Payer: MEDICAID

## 2018-05-30 ENCOUNTER — TELEPHONE (OUTPATIENT)
Dept: OBGYN CLINIC | Age: 23
End: 2018-05-30

## 2018-05-30 VITALS
HEIGHT: 61 IN | HEART RATE: 98 BPM | DIASTOLIC BLOOD PRESSURE: 73 MMHG | SYSTOLIC BLOOD PRESSURE: 109 MMHG | WEIGHT: 138 LBS | BODY MASS INDEX: 26.06 KG/M2

## 2018-05-30 DIAGNOSIS — R10.9 ABDOMINAL CRAMPING: Primary | ICD-10-CM

## 2018-05-30 LAB
CONTROL: PRESENT
PREGNANCY TEST URINE, POC: NEGATIVE

## 2018-05-30 PROCEDURE — 1036F TOBACCO NON-USER: CPT | Performed by: ADVANCED PRACTICE MIDWIFE

## 2018-05-30 PROCEDURE — G8419 CALC BMI OUT NRM PARAM NOF/U: HCPCS | Performed by: ADVANCED PRACTICE MIDWIFE

## 2018-05-30 PROCEDURE — 81025 URINE PREGNANCY TEST: CPT | Performed by: ADVANCED PRACTICE MIDWIFE

## 2018-05-30 PROCEDURE — G8427 DOCREV CUR MEDS BY ELIG CLIN: HCPCS | Performed by: ADVANCED PRACTICE MIDWIFE

## 2018-05-30 PROCEDURE — 99213 OFFICE O/P EST LOW 20 MIN: CPT | Performed by: ADVANCED PRACTICE MIDWIFE

## 2018-05-31 ENCOUNTER — PROCEDURE VISIT (OUTPATIENT)
Dept: OBGYN CLINIC | Age: 23
End: 2018-05-31
Payer: MEDICAID

## 2018-05-31 VITALS
DIASTOLIC BLOOD PRESSURE: 70 MMHG | HEIGHT: 61 IN | BODY MASS INDEX: 26.06 KG/M2 | SYSTOLIC BLOOD PRESSURE: 114 MMHG | HEART RATE: 101 BPM | WEIGHT: 138 LBS

## 2018-05-31 DIAGNOSIS — Z30.017 NEXPLANON INSERTION: Primary | ICD-10-CM

## 2018-05-31 PROCEDURE — 11981 INSERTION DRUG DLVR IMPLANT: CPT | Performed by: ADVANCED PRACTICE MIDWIFE

## 2018-06-01 ENCOUNTER — TELEPHONE (OUTPATIENT)
Dept: OBGYN CLINIC | Age: 23
End: 2018-06-01

## 2018-06-06 ENCOUNTER — OFFICE VISIT (OUTPATIENT)
Dept: OBGYN CLINIC | Age: 23
End: 2018-06-06

## 2018-06-06 VITALS
HEIGHT: 61 IN | BODY MASS INDEX: 24.55 KG/M2 | DIASTOLIC BLOOD PRESSURE: 76 MMHG | WEIGHT: 130 LBS | SYSTOLIC BLOOD PRESSURE: 112 MMHG | HEART RATE: 77 BPM

## 2018-06-06 DIAGNOSIS — Z30.46 ENCOUNTER FOR SURVEILLANCE OF NEXPLANON SUBDERMAL CONTRACEPTIVE: Primary | ICD-10-CM

## 2018-06-06 DIAGNOSIS — Z97.5 NEXPLANON IN PLACE: ICD-10-CM

## 2018-06-06 PROCEDURE — G8427 DOCREV CUR MEDS BY ELIG CLIN: HCPCS | Performed by: OBSTETRICS & GYNECOLOGY

## 2018-06-06 PROCEDURE — 1036F TOBACCO NON-USER: CPT | Performed by: OBSTETRICS & GYNECOLOGY

## 2018-06-06 PROCEDURE — 99024 POSTOP FOLLOW-UP VISIT: CPT | Performed by: OBSTETRICS & GYNECOLOGY

## 2018-06-06 PROCEDURE — G8420 CALC BMI NORM PARAMETERS: HCPCS | Performed by: OBSTETRICS & GYNECOLOGY

## 2018-06-06 RX ORDER — IBUPROFEN 800 MG/1
800 TABLET ORAL EVERY 6 HOURS PRN
Qty: 120 TABLET | Refills: 0 | Status: SHIPPED | OUTPATIENT
Start: 2018-06-06 | End: 2019-12-30 | Stop reason: SDUPTHER

## 2018-06-11 ENCOUNTER — OFFICE VISIT (OUTPATIENT)
Dept: FAMILY MEDICINE CLINIC | Age: 23
End: 2018-06-11
Payer: MEDICAID

## 2018-06-11 VITALS
OXYGEN SATURATION: 99 % | DIASTOLIC BLOOD PRESSURE: 69 MMHG | RESPIRATION RATE: 16 BRPM | SYSTOLIC BLOOD PRESSURE: 99 MMHG | BODY MASS INDEX: 24.39 KG/M2 | TEMPERATURE: 97.4 F | WEIGHT: 129.2 LBS | HEIGHT: 61 IN | HEART RATE: 91 BPM

## 2018-06-11 DIAGNOSIS — J40 BRONCHITIS: Primary | ICD-10-CM

## 2018-06-11 DIAGNOSIS — Q24.9 CONGENITAL HEART DEFECT: ICD-10-CM

## 2018-06-11 DIAGNOSIS — Z98.890 S/P LEEP: ICD-10-CM

## 2018-06-11 DIAGNOSIS — R87.613 HGSIL (HIGH GRADE SQUAMOUS INTRAEPITHELIAL LESION) ON PAP SMEAR OF CERVIX: ICD-10-CM

## 2018-06-11 PROBLEM — R87.619 ABNORMAL PAP SMEAR OF CERVIX: Status: RESOLVED | Noted: 2018-02-14 | Resolved: 2018-06-11

## 2018-06-11 PROBLEM — Z30.017 NEXPLANON INSERTION: Status: RESOLVED | Noted: 2018-05-31 | Resolved: 2018-06-11

## 2018-06-11 PROCEDURE — G8420 CALC BMI NORM PARAMETERS: HCPCS | Performed by: FAMILY MEDICINE

## 2018-06-11 PROCEDURE — 99213 OFFICE O/P EST LOW 20 MIN: CPT | Performed by: FAMILY MEDICINE

## 2018-06-11 PROCEDURE — G8427 DOCREV CUR MEDS BY ELIG CLIN: HCPCS | Performed by: FAMILY MEDICINE

## 2018-06-11 PROCEDURE — 1036F TOBACCO NON-USER: CPT | Performed by: FAMILY MEDICINE

## 2018-06-11 RX ORDER — ALBUTEROL SULFATE 90 UG/1
2 AEROSOL, METERED RESPIRATORY (INHALATION) EVERY 6 HOURS PRN
Qty: 1 INHALER | Refills: 0 | Status: SHIPPED | OUTPATIENT
Start: 2018-06-11

## 2018-06-11 RX ORDER — FLUTICASONE PROPIONATE 50 MCG
2 SPRAY, SUSPENSION (ML) NASAL DAILY
Qty: 1 BOTTLE | Refills: 0 | Status: SHIPPED | OUTPATIENT
Start: 2018-06-11 | End: 2019-04-18 | Stop reason: SDUPTHER

## 2018-06-11 RX ORDER — GUAIFENESIN 600 MG/1
600 TABLET, EXTENDED RELEASE ORAL 3 TIMES DAILY
Qty: 30 TABLET | Refills: 0 | Status: SHIPPED | OUTPATIENT
Start: 2018-06-11 | End: 2018-08-28 | Stop reason: SDUPTHER

## 2018-06-11 RX ORDER — AMOXICILLIN AND CLAVULANATE POTASSIUM 875; 125 MG/1; MG/1
1 TABLET, FILM COATED ORAL 2 TIMES DAILY
Qty: 14 TABLET | Refills: 0 | Status: SHIPPED | OUTPATIENT
Start: 2018-06-11 | End: 2018-06-18

## 2018-06-11 ASSESSMENT — ENCOUNTER SYMPTOMS
COUGH: 1
SORE THROAT: 0
SHORTNESS OF BREATH: 1

## 2018-06-11 ASSESSMENT — PATIENT HEALTH QUESTIONNAIRE - PHQ9
SUM OF ALL RESPONSES TO PHQ QUESTIONS 1-9: 0
2. FEELING DOWN, DEPRESSED OR HOPELESS: 0
1. LITTLE INTEREST OR PLEASURE IN DOING THINGS: 0
SUM OF ALL RESPONSES TO PHQ9 QUESTIONS 1 & 2: 0

## 2018-07-25 ENCOUNTER — OFFICE VISIT (OUTPATIENT)
Dept: FAMILY MEDICINE CLINIC | Age: 23
End: 2018-07-25
Payer: MEDICAID

## 2018-07-25 VITALS
HEIGHT: 61 IN | BODY MASS INDEX: 23.79 KG/M2 | OXYGEN SATURATION: 98 % | SYSTOLIC BLOOD PRESSURE: 108 MMHG | HEART RATE: 74 BPM | RESPIRATION RATE: 16 BRPM | WEIGHT: 126 LBS | DIASTOLIC BLOOD PRESSURE: 74 MMHG | TEMPERATURE: 97.6 F

## 2018-07-25 DIAGNOSIS — K59.00 CONSTIPATION, UNSPECIFIED CONSTIPATION TYPE: ICD-10-CM

## 2018-07-25 DIAGNOSIS — Z97.5 NEXPLANON IN PLACE: ICD-10-CM

## 2018-07-25 DIAGNOSIS — R10.31 RIGHT LOWER QUADRANT PAIN: Primary | ICD-10-CM

## 2018-07-25 DIAGNOSIS — R10.31 RIGHT LOWER QUADRANT PAIN: ICD-10-CM

## 2018-07-25 PROCEDURE — G8420 CALC BMI NORM PARAMETERS: HCPCS | Performed by: FAMILY MEDICINE

## 2018-07-25 PROCEDURE — G8427 DOCREV CUR MEDS BY ELIG CLIN: HCPCS | Performed by: FAMILY MEDICINE

## 2018-07-25 PROCEDURE — 1036F TOBACCO NON-USER: CPT | Performed by: FAMILY MEDICINE

## 2018-07-25 PROCEDURE — 99213 OFFICE O/P EST LOW 20 MIN: CPT | Performed by: FAMILY MEDICINE

## 2018-07-25 RX ORDER — POLYETHYLENE GLYCOL 3350 17 G/17G
17 POWDER, FOR SOLUTION ORAL DAILY
Qty: 510 G | Refills: 0 | Status: SHIPPED | OUTPATIENT
Start: 2018-07-25 | End: 2018-07-25 | Stop reason: SDUPTHER

## 2018-07-25 RX ORDER — POLYETHYLENE GLYCOL 3350 17 G/17G
17 POWDER, FOR SOLUTION ORAL DAILY
Qty: 510 G | Refills: 0 | Status: SHIPPED | OUTPATIENT
Start: 2018-07-25 | End: 2018-08-24

## 2018-07-25 RX ORDER — ONDANSETRON HYDROCHLORIDE 8 MG/1
8 TABLET, FILM COATED ORAL EVERY 8 HOURS PRN
Qty: 30 TABLET | Refills: 0 | Status: SHIPPED | OUTPATIENT
Start: 2018-07-25 | End: 2018-07-25 | Stop reason: SDUPTHER

## 2018-07-25 RX ORDER — ONDANSETRON HYDROCHLORIDE 8 MG/1
TABLET, FILM COATED ORAL
Qty: 30 TABLET | Refills: 0 | Status: SHIPPED | OUTPATIENT
Start: 2018-07-25

## 2018-07-25 ASSESSMENT — ENCOUNTER SYMPTOMS
NAUSEA: 1
ABDOMINAL PAIN: 1
DIARRHEA: 0
VOMITING: 0
CONSTIPATION: 1

## 2018-07-25 NOTE — PROGRESS NOTES
Visit Information    Have you changed or started any medications since your last visit including any over-the-counter medicines, vitamins, or herbal medicines? no   Are you having any side effects from any of your medications? -  no  Have you stopped taking any of your medications? Is so, why? -  no    Have you seen any other physician or provider since your last visit? No  Have you had any other diagnostic tests since your last visit? No  Have you been seen in the emergency room and/or had an admission to a hospital since we last saw you? No  Have you had your routine dental cleaning in the past 6 months? no    Have you activated your [employer] account? If not, what are your barriers?  Yes     Patient Care Team:  Felipe Waters MD as PCP - General (Family Medicine)    Medical History Review  Past Medical, Family, and Social History reviewed and does not contribute to the patient presenting condition    Health Maintenance   Topic Date Due    Flu vaccine (1) 09/01/2018    Cervical cancer screen  12/14/2018    DTaP/Tdap/Td vaccine (2 - Td) 08/24/2024    HIV screen  Completed

## 2018-07-25 NOTE — LETTER
[facility]  [id number] [place] [address]  Phone: [phone]  Fax: [phone]    Arvin Del Toro MD        July 25, 2018     Patient: Nanci Hinojosa   YOB: 1995   Date of Visit: 7/25/2018       To Whom it May Concern:    Vee Luciano was seen in my clinic on 7/25/2018. She may return to work on July 26. .    If you have any questions or concerns, please don't hesitate to call.     Sincerely,         Arvin Del Toro MD

## 2018-07-25 NOTE — PROGRESS NOTES
infections as a child    Nexplanon in place     18    Vision abnormalities     Wears glasses      Past Surgical History:   Procedure Laterality Date    CARDIAC SURGERY      hole in heart at birth     SECTION      2x    DENTAL SURGERY      wisdom teeth    LEEP  2018    top hat    NE COLPOSCOPY,CERVIX W/ADJ VAGINA, CURETTAG N/A 2018    LEEP WITH TOP HAT performed by Wil Salmeron MD at [facility] History   Problem Relation Age of Onset    Breast Cancer Other     Hypertension Maternal Grandmother     Cancer Maternal Grandmother         tongue    Diabetes Mother     Hypertension Mother     Breast Cancer Maternal Aunt     Heart Disease Son     Colon Cancer Neg Hx     Eclampsia Neg Hx     Ovarian Cancer Neg Hx      Labor Neg Hx     Spont Abortions Neg Hx     Stroke Neg Hx      Social History   Substance Use Topics    Smoking status: Never Smoker    Smokeless tobacco: Never Used    Alcohol use No       Current Outpatient Prescriptions:     polyethylene glycol (MIRALAX) powder, Take 17 g by mouth daily, Disp: 510 g, Rfl: 0    ondansetron (ZOFRAN) 8 MG tablet, TAKE ONE TABLET BY MOUTH EVERY 8 HOURS AS NEEDED FOR NAUSEA OR VOMITING, Disp: 30 tablet, Rfl: 0    albuterol sulfate HFA (VENTOLIN HFA) 108 (90 Base) MCG/ACT inhaler, Inhale 2 puffs into the lungs every 6 hours as needed for Wheezing, Disp: 1 Inhaler, Rfl: 0    fluticasone (FLONASE) 50 MCG/ACT nasal spray, 2 sprays by Nasal route daily, Disp: 1 Bottle, Rfl: 0    guaiFENesin (MUCINEX) 600 MG extended release tablet, Take 1 tablet by mouth 3 times daily, Disp: 30 tablet, Rfl: 0    etonogestrel (NEXPLANON) 68 MG implant, 68 mg by Subdermal route once, Disp: , Rfl:     ibuprofen (ADVIL;MOTRIN) 800 MG tablet, Take 1 tablet by mouth every 6 hours as needed for Pain, Disp: 120 tablet, Rfl: 0    Subjective:     Review of Systems   Constitutional: Negative for fever and weight loss.    Gastrointestinal: Positive for abdominal pain, constipation and nausea. Negative for anorexia, diarrhea, melena and vomiting. Genitourinary: Negative for dysuria, frequency and hematuria. Musculoskeletal: Negative for myalgias. Neurological: Negative for headaches. Objective:     /74   Pulse 74   Temp 97.6 °F (36.4 °C)   Resp 16   Ht 5' 0.98\" (1.549 m)   Wt 126 lb (57.2 kg)   SpO2 98%   BMI 23.82 kg/m²     Physical Exam   Constitutional: She is oriented to person, place, and time. She appears well-developed. HENT:   Head: Normocephalic. Eyes: Conjunctivae and EOM are normal.   Cardiovascular: Normal heart sounds. No murmur heard. Pulmonary/Chest: Effort normal and breath sounds normal. No respiratory distress. She has no wheezes. Abdominal: Soft. She exhibits no distension and no mass. There is tenderness. There is no rebound and no guarding. Mild tenderness described on palpation over the right lower quadrant. Patient is seated and lying down, her bili and not in any distress. Negative Brock's per   Neurological: She is alert and oriented to person, place, and time. Skin: She is not diaphoretic. Site where nexplanon is inserted was examined. No tenderness on palpation over the site. No erythema or swelling. Psychiatric: She has a normal mood and affect. Her behavior is normal. Judgment and thought content normal.       Assessment & Plan:      1. Right lower quadrant pain  Could be viral.  No red flags. Discussed warning signs for appendicitis including fevers, worsening pain, pain radiating to either side of the abdomen, loss of appetite. Discussed seeking medical attention should symptoms worsen or change. For now recommended fluids and Zofran as needed    2.  Constipation, unspecified constipation type  Recommend increasing water intake  Recommend high fiber diet  Recommend avoiding constipating foods - eg. cheese and bananas  Advised to use MiraLAX 1/2-1 capful 1-2 times per day

## 2018-07-25 NOTE — TELEPHONE ENCOUNTER
Medication: Zofran 8 mg   Last visit: today  Next visit: Visit date not found  Last refill: 7/25/18  [facility]  PCP:           Jim Martines MD

## 2018-08-28 ENCOUNTER — OFFICE VISIT (OUTPATIENT)
Dept: FAMILY MEDICINE CLINIC | Age: 23
End: 2018-08-28
Payer: MEDICAID

## 2018-08-28 ENCOUNTER — TELEPHONE (OUTPATIENT)
Dept: OBGYN CLINIC | Age: 23
End: 2018-08-28

## 2018-08-28 VITALS
TEMPERATURE: 97.7 F | DIASTOLIC BLOOD PRESSURE: 71 MMHG | BODY MASS INDEX: 23.45 KG/M2 | RESPIRATION RATE: 16 BRPM | HEART RATE: 70 BPM | WEIGHT: 124.2 LBS | SYSTOLIC BLOOD PRESSURE: 104 MMHG | HEIGHT: 61 IN | OXYGEN SATURATION: 96 %

## 2018-08-28 DIAGNOSIS — H66.003 ACUTE SUPPURATIVE OTITIS MEDIA OF BOTH EARS WITHOUT SPONTANEOUS RUPTURE OF TYMPANIC MEMBRANES, RECURRENCE NOT SPECIFIED: Primary | ICD-10-CM

## 2018-08-28 DIAGNOSIS — H60.391 OTHER INFECTIVE ACUTE OTITIS EXTERNA OF RIGHT EAR: ICD-10-CM

## 2018-08-28 PROCEDURE — G8427 DOCREV CUR MEDS BY ELIG CLIN: HCPCS | Performed by: FAMILY MEDICINE

## 2018-08-28 PROCEDURE — 4130F TOPICAL PREP RX AOE: CPT | Performed by: FAMILY MEDICINE

## 2018-08-28 PROCEDURE — 1036F TOBACCO NON-USER: CPT | Performed by: FAMILY MEDICINE

## 2018-08-28 PROCEDURE — G8420 CALC BMI NORM PARAMETERS: HCPCS | Performed by: FAMILY MEDICINE

## 2018-08-28 PROCEDURE — 99213 OFFICE O/P EST LOW 20 MIN: CPT | Performed by: FAMILY MEDICINE

## 2018-08-28 RX ORDER — CIPROFLOXACIN AND DEXAMETHASONE 3; 1 MG/ML; MG/ML
4 SUSPENSION/ DROPS AURICULAR (OTIC) 2 TIMES DAILY
Qty: 1 BOTTLE | Refills: 0 | Status: SHIPPED | OUTPATIENT
Start: 2018-08-28 | End: 2018-09-04

## 2018-08-28 RX ORDER — GUAIFENESIN 600 MG/1
600 TABLET, EXTENDED RELEASE ORAL 2 TIMES DAILY
Qty: 30 TABLET | Refills: 0 | Status: SHIPPED | OUTPATIENT
Start: 2018-08-28

## 2018-08-28 RX ORDER — BENZONATATE 100 MG/1
100 CAPSULE ORAL 3 TIMES DAILY PRN
Qty: 30 CAPSULE | Refills: 0 | Status: SHIPPED | OUTPATIENT
Start: 2018-08-28 | End: 2018-09-04

## 2018-08-28 RX ORDER — CIPROFLOXACIN AND DEXAMETHASONE 3; 1 MG/ML; MG/ML
4 SUSPENSION/ DROPS AURICULAR (OTIC) 2 TIMES DAILY
Qty: 1 BOTTLE | Refills: 0 | Status: SHIPPED | OUTPATIENT
Start: 2018-08-28 | End: 2018-08-28 | Stop reason: SDUPTHER

## 2018-08-28 ASSESSMENT — ENCOUNTER SYMPTOMS
VOMITING: 0
WHEEZING: 0
COUGH: 1
SORE THROAT: 1
DIARRHEA: 0

## 2018-08-28 NOTE — PROGRESS NOTES
Visit Information    Have you changed or started any medications since your last visit including any over-the-counter medicines, vitamins, or herbal medicines? no   Are you having any side effects from any of your medications? -  no  Have you stopped taking any of your medications? Is so, why? -  no    Have you seen any other physician or provider since your last visit? No  Have you had any other diagnostic tests since your last visit? No  Have you been seen in the emergency room and/or had an admission to a hospital since we last saw you? No  Have you had your routine dental cleaning in the past 6 months? no    Have you activated your [employer] account? If not, what are your barriers?  Yes     Patient Care Team:  Ramon Cho MD as PCP - General (Family Medicine)    Medical History Review  Past Medical, Family, and Social History reviewed and does not contribute to the patient presenting condition    Health Maintenance   Topic Date Due    Flu vaccine (1) 09/01/2018    Cervical cancer screen  12/14/2018    DTaP/Tdap/Td vaccine (2 - Td) 08/24/2024    HIV screen  Completed

## 2018-08-28 NOTE — PROGRESS NOTES
spray, 2 sprays by Nasal route daily, Disp: 1 Bottle, Rfl: 0    etonogestrel (NEXPLANON) 68 MG implant, 68 mg by Subdermal route once, Disp: , Rfl:     ibuprofen (ADVIL;MOTRIN) 800 MG tablet, Take 1 tablet by mouth every 6 hours as needed for Pain, Disp: 120 tablet, Rfl: 0    Subjective:     Review of Systems   Constitutional: Negative for fever. HENT: Positive for ear pain, hearing loss, postnasal drip and sore throat. Respiratory: Positive for cough. Negative for wheezing. Cardiovascular: Negative for chest pain. Gastrointestinal: Negative for diarrhea and vomiting. Musculoskeletal: Negative for neck pain. Skin: Negative for rash. Objective:     /71   Pulse 70   Temp 97.7 °F (36.5 °C)   Resp 16   Ht 5' 0.98\" (1.549 m)   Wt 124 lb 3.2 oz (56.3 kg)   SpO2 96%   BMI 23.48 kg/m²     Physical Exam   Constitutional: She is oriented to person, place, and time. She appears well-developed. HENT:   Head: Normocephalic. Right Ear: Tympanic membrane is erythematous and bulging. Left Ear: External ear normal. Tympanic membrane is erythematous and bulging. Mouth/Throat: Oropharynx is clear and moist. No oropharyngeal exudate. Right ear canal has white gooey exudate   Eyes: Conjunctivae and EOM are normal.   Neck: Normal range of motion. Cardiovascular: Normal heart sounds. No murmur heard. Pulmonary/Chest: Effort normal and breath sounds normal. No respiratory distress. She has no wheezes. Lymphadenopathy:     She has no cervical adenopathy. Neurological: She is alert and oriented to person, place, and time. Skin: She is not diaphoretic. Psychiatric: She has a normal mood and affect. Her behavior is normal. Judgment and thought content normal.       Assessment & Plan:      1. Acute suppurative otitis media of both ears without spontaneous rupture of tympanic membranes, recurrence not specified  Continue course of antibiotics as prescribed the ER.   Recommended Mucinex for the congestion and Tessalon Perles for the cough. Recommended rest, hydration, warm salt water gargles  Can try OTC cepacol lozenges or chloraseptic sore throat spray for comfort  Follow up if sx don't improve or worsen  - guaiFENesin (MUCINEX) 600 MG extended release tablet; Take 1 tablet by mouth 2 times daily  Dispense: 30 tablet; Refill: 0  - benzonatate (TESSALON PERLES) 100 MG capsule; Take 1 capsule by mouth 3 times daily as needed for Cough  Dispense: 30 capsule; Refill: 0    2. Other infective acute otitis externa of right ear  Rx for Ciprodex ear drops for otitis externa of the right, follow-up should symptoms persist  - ciprofloxacin-dexamethasone (CIPRODEX) 0.3-0.1 % otic suspension; Place 4 drops into the right ear 2 times daily for 7 days  Dispense: 1 Bottle; Refill: 0    Call or return to clinic prn if these symptoms worsen or fail to improve as anticipated. I have reviewed the instructions with the patient, answering all questions to their satisfaction.     Electronically signed by Lisa Hdez MD on 8/28/2018 at 12:09 PM

## 2018-08-29 ENCOUNTER — TELEPHONE (OUTPATIENT)
Dept: FAMILY MEDICINE CLINIC | Age: 23
End: 2018-08-29

## 2018-08-29 RX ORDER — NEOMYCIN SULFATE, POLYMYXIN B SULFATE, HYDROCORTISONE 3.5; 10000; 1 MG/ML; [USP'U]/ML; MG/ML
SOLUTION/ DROPS AURICULAR (OTIC)
Qty: 1 BOTTLE | Refills: 1 | Status: SHIPPED | OUTPATIENT
Start: 2018-08-29 | End: 2018-09-08

## 2018-09-06 ENCOUNTER — OFFICE VISIT (OUTPATIENT)
Dept: FAMILY MEDICINE CLINIC | Age: 23
End: 2018-09-06
Payer: MEDICAID

## 2018-09-06 VITALS
OXYGEN SATURATION: 96 % | HEIGHT: 61 IN | WEIGHT: 125.2 LBS | HEART RATE: 87 BPM | SYSTOLIC BLOOD PRESSURE: 113 MMHG | RESPIRATION RATE: 16 BRPM | BODY MASS INDEX: 23.64 KG/M2 | DIASTOLIC BLOOD PRESSURE: 78 MMHG

## 2018-09-06 DIAGNOSIS — Z23 NEEDS FLU SHOT: ICD-10-CM

## 2018-09-06 DIAGNOSIS — R10.9 ABDOMINAL CRAMPS: Primary | ICD-10-CM

## 2018-09-06 DIAGNOSIS — K21.9 GASTROESOPHAGEAL REFLUX DISEASE, ESOPHAGITIS PRESENCE NOT SPECIFIED: ICD-10-CM

## 2018-09-06 DIAGNOSIS — T14.8XXA MUSCLE STRAIN: ICD-10-CM

## 2018-09-06 PROCEDURE — 90471 IMMUNIZATION ADMIN: CPT | Performed by: FAMILY MEDICINE

## 2018-09-06 PROCEDURE — 1036F TOBACCO NON-USER: CPT | Performed by: FAMILY MEDICINE

## 2018-09-06 PROCEDURE — G8427 DOCREV CUR MEDS BY ELIG CLIN: HCPCS | Performed by: FAMILY MEDICINE

## 2018-09-06 PROCEDURE — 99214 OFFICE O/P EST MOD 30 MIN: CPT | Performed by: FAMILY MEDICINE

## 2018-09-06 PROCEDURE — 90686 IIV4 VACC NO PRSV 0.5 ML IM: CPT | Performed by: FAMILY MEDICINE

## 2018-09-06 PROCEDURE — G8420 CALC BMI NORM PARAMETERS: HCPCS | Performed by: FAMILY MEDICINE

## 2018-09-06 RX ORDER — CYCLOBENZAPRINE HCL 5 MG
5 TABLET ORAL 3 TIMES DAILY PRN
Qty: 30 TABLET | Refills: 0 | Status: SHIPPED | OUTPATIENT
Start: 2018-09-06 | End: 2018-09-16

## 2018-09-06 RX ORDER — OMEPRAZOLE 20 MG/1
20 TABLET, DELAYED RELEASE ORAL DAILY
Qty: 30 TABLET | Refills: 3 | Status: SHIPPED | OUTPATIENT
Start: 2018-09-06

## 2018-09-06 NOTE — PROGRESS NOTES
Visit Information    Have you changed or started any medications since your last visit including any over-the-counter medicines, vitamins, or herbal medicines? no   Are you having any side effects from any of your medications? -  no  Have you stopped taking any of your medications? Is so, why? -  no    Have you seen any other physician or provider since your last visit? No  Have you had any other diagnostic tests since your last visit? No  Have you been seen in the emergency room and/or had an admission to a hospital since we last saw you? No  Have you had your routine dental cleaning in the past 6 months? no    Have you activated your [employer] account? If not, what are your barriers?  Yes       Patient Care Team:  Deirdre Tony MD as PCP - General (Family Medicine)    Medical History Review  Past Medical, Family, and Social History reviewed and does not contribute to the patient presenting condition    Health Maintenance   Topic Date Due    Flu vaccine (1) 09/01/2018    Cervical cancer screen  12/14/2018    DTaP/Tdap/Td vaccine (2 - Td) 08/24/2024    HIV screen  Completed

## 2018-09-10 ENCOUNTER — OFFICE VISIT (OUTPATIENT)
Dept: OBGYN CLINIC | Age: 23
End: 2018-09-10
Payer: MEDICAID

## 2018-09-10 VITALS
BODY MASS INDEX: 24.7 KG/M2 | HEART RATE: 93 BPM | SYSTOLIC BLOOD PRESSURE: 119 MMHG | WEIGHT: 125.8 LBS | DIASTOLIC BLOOD PRESSURE: 80 MMHG | HEIGHT: 60 IN

## 2018-09-10 DIAGNOSIS — N93.9 VAGINAL BLEEDING: Primary | ICD-10-CM

## 2018-09-10 PROCEDURE — 1036F TOBACCO NON-USER: CPT | Performed by: OBSTETRICS & GYNECOLOGY

## 2018-09-10 PROCEDURE — 99212 OFFICE O/P EST SF 10 MIN: CPT | Performed by: OBSTETRICS & GYNECOLOGY

## 2018-09-10 PROCEDURE — G8420 CALC BMI NORM PARAMETERS: HCPCS | Performed by: OBSTETRICS & GYNECOLOGY

## 2018-09-10 PROCEDURE — G8427 DOCREV CUR MEDS BY ELIG CLIN: HCPCS | Performed by: OBSTETRICS & GYNECOLOGY

## 2018-09-10 NOTE — PROGRESS NOTES
The patient was seen today. She is here regarding bleeding . Her bowels are regular and she is voiding without difficulty. HPI: 20yo using Nexplanon, having third month of periods but this one was very heavy, due for pap today but started today. Bright red which made her nervous. No f/c. 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    HGSIL (high grade squamous intraepithelial lesion) on Pap smear of cervix 1/11/2018    History of frequent ear infections as a child    Nexplanon in place     5/31/18    Vision abnormalities     Wears glasses      Past Surgical History:   Procedure Laterality Date    CARDIAC SURGERY      hole in heart at birth   [address] [facility]      2x    DENTAL SURGERY      wisdom teeth    LEEP  02/16/2018    top hat    [place] COLPOSCOPY,CERVIX W/ADJ VAGINA, CURETTAG N/A 2/16/2018    LEEP WITH TOP HAT performed by Sander Bowser MD at [facility]:        A minimum of an eleven point review of systems was completed and found to be negative except for the pertinent positives found below. Constitutional: No fever, chills or malaise; No weight change or fatigue  Head and Eyes: No vision, Headache, Dizziness or trauma in last 12 months  ENT ROS: No hearing, Tinnitis, sinus or taste problems  Hematological and Lymphatic ROS:No Lymphoma, Von Willebrand's, Hemophillia or Bleeding History  Psych ROS: No Depression, Homicidal thoughts,suicidal thoughts, or anxiety  Breast ROS: No prior breast abnormalities or lumps  Respiratory ROS: No SOB, Pneumoniae,Cough, or Pulmonary Embolism History  Cardiovascular ROS: No Chest Pain with Exertion, Palpitations, Syncope, Edema, Arrhythmia  Gastrointestinal ROS: No Indigestion, Heartburn, Nausea, vomiting, Diahrea, Constipation,or Bowel Changes;  No Bloody Stools or melena  Genito-Urinary ROS: No Dysuria, Hematuria or Nocturia. No Urinary Incontinence or Vaginal Discharge  Musculoskeletal ROS: No Arthralgia, Arthritis,Gout,Osteoporosis or Rheumatism  Neurological ROS: No CVA, Migraines, Epilepsy, Seizure Hx, or Limb Weakness  Dermatological ROS: No Rash, Itching, Hives, Mole Changes or Cancer                                            Blood pressure 119/80, pulse 93, height 5' (1.524 m), weight 125 lb 12.8 oz (57.1 kg), last menstrual period 09/10/2018, not currently breastfeeding. Abdomen: Soft non-tender; good bowel sounds. No guarding, rebound or rigidity. No CVA tenderness bilaterally. Extremities: No calf tenderness, DTR 2/4, and No edema bilaterally    Pelvic: Exam deferred. Assessment:  Nexplanon maintenance-experience consistent with patient reporting    PLAN:  Follow up next week for repeat pap due to HGSIL treated with LEEP  Return to the office in 1 weeks. Counseled on preventative health maintenance follow-up. Patient was seen with total face to face time of 10 minutes. More than 50% of this visit was counseling and education regarding There were no encounter diagnoses. and Contraception (Talk about Nexplanon)   as well as  counseling on preventative health maintenance follow-up.

## 2018-09-24 ENCOUNTER — TELEPHONE (OUTPATIENT)
Dept: FAMILY MEDICINE CLINIC | Age: 23
End: 2018-09-24

## 2018-09-24 NOTE — TELEPHONE ENCOUNTER
Pt states her low back pain is getting to the point where it is hard to move or walk for the past week, especially in the morning. She has tried to Ibuprofen 800 mg and they were helping but they are not helping anymore. Please advise.

## 2018-10-15 ENCOUNTER — OFFICE VISIT (OUTPATIENT)
Dept: FAMILY MEDICINE CLINIC | Age: 23
End: 2018-10-15
Payer: MEDICAID

## 2018-10-15 VITALS
HEIGHT: 60 IN | DIASTOLIC BLOOD PRESSURE: 73 MMHG | SYSTOLIC BLOOD PRESSURE: 108 MMHG | HEART RATE: 85 BPM | WEIGHT: 129.2 LBS | OXYGEN SATURATION: 98 % | BODY MASS INDEX: 25.36 KG/M2 | RESPIRATION RATE: 16 BRPM

## 2018-10-15 DIAGNOSIS — S83.92XA SPRAIN OF LEFT KNEE, UNSPECIFIED LIGAMENT, INITIAL ENCOUNTER: Primary | ICD-10-CM

## 2018-10-15 PROCEDURE — 1036F TOBACCO NON-USER: CPT | Performed by: FAMILY MEDICINE

## 2018-10-15 PROCEDURE — G8427 DOCREV CUR MEDS BY ELIG CLIN: HCPCS | Performed by: FAMILY MEDICINE

## 2018-10-15 PROCEDURE — 99213 OFFICE O/P EST LOW 20 MIN: CPT | Performed by: FAMILY MEDICINE

## 2018-10-15 PROCEDURE — G8482 FLU IMMUNIZE ORDER/ADMIN: HCPCS | Performed by: FAMILY MEDICINE

## 2018-10-15 PROCEDURE — G8419 CALC BMI OUT NRM PARAM NOF/U: HCPCS | Performed by: FAMILY MEDICINE

## 2018-10-15 ASSESSMENT — ENCOUNTER SYMPTOMS: SHORTNESS OF BREATH: 0

## 2018-10-15 NOTE — PROGRESS NOTES
normal. No respiratory distress. She has no wheezes. Musculoskeletal:        Right knee: She exhibits normal range of motion, no swelling, no deformity, no erythema, normal alignment, no LCL laxity, normal patellar mobility, normal meniscus and no MCL laxity. Tenderness found. Patellar tendon tenderness noted. No medial joint line and no lateral joint line tenderness noted. Neurological: She is alert and oriented to person, place, and time. Skin: She is not diaphoretic. Psychiatric: She has a normal mood and affect. Her behavior is normal. Judgment and thought content normal.       Assessment & Plan:      1. Sprain of left knee, unspecified ligament, initial encounter  MRI to r/o meniscal injury. Recommended avoiding kneeling, PT, rest, ibuprofen. May need to see ortho if popping and pain persist.  - [facility] - [facility]  - MRI KNEE LEFT WO CONTRAST; Future    Call or return to clinic prn if these symptoms worsen or fail to improve as anticipated. I have reviewed the instructions with the patient, answering all questions to their satisfaction.     Electronically signed by Wally Crowell MD on 10/15/2018 at 2:32 PM

## 2018-11-07 ENCOUNTER — OFFICE VISIT (OUTPATIENT)
Dept: FAMILY MEDICINE CLINIC | Age: 23
End: 2018-11-07
Payer: MEDICAID

## 2018-11-07 ENCOUNTER — OFFICE VISIT (OUTPATIENT)
Dept: OBGYN CLINIC | Age: 23
End: 2018-11-07
Payer: MEDICAID

## 2018-11-07 ENCOUNTER — HOSPITAL ENCOUNTER (OUTPATIENT)
Age: 23
Setting detail: SPECIMEN
Discharge: HOME | End: 2018-11-07
Payer: MEDICAID

## 2018-11-07 VITALS
WEIGHT: 130.4 LBS | RESPIRATION RATE: 16 BRPM | DIASTOLIC BLOOD PRESSURE: 71 MMHG | HEIGHT: 60 IN | OXYGEN SATURATION: 94 % | SYSTOLIC BLOOD PRESSURE: 106 MMHG | BODY MASS INDEX: 25.6 KG/M2 | HEART RATE: 87 BPM

## 2018-11-07 VITALS
HEART RATE: 80 BPM | WEIGHT: 130.13 LBS | SYSTOLIC BLOOD PRESSURE: 109 MMHG | HEIGHT: 60 IN | DIASTOLIC BLOOD PRESSURE: 72 MMHG | BODY MASS INDEX: 25.55 KG/M2

## 2018-11-07 DIAGNOSIS — S46.912A STRAIN OF LEFT SHOULDER, INITIAL ENCOUNTER: Primary | ICD-10-CM

## 2018-11-07 DIAGNOSIS — Z98.890 S/P LEEP: ICD-10-CM

## 2018-11-07 DIAGNOSIS — M75.42 IMPINGEMENT SYNDROME OF LEFT SHOULDER: ICD-10-CM

## 2018-11-07 DIAGNOSIS — R87.613 HGSIL (HIGH GRADE SQUAMOUS INTRAEPITHELIAL LESION) ON PAP SMEAR OF CERVIX: Primary | ICD-10-CM

## 2018-11-07 PROCEDURE — G8427 DOCREV CUR MEDS BY ELIG CLIN: HCPCS | Performed by: OBSTETRICS & GYNECOLOGY

## 2018-11-07 PROCEDURE — 1036F TOBACCO NON-USER: CPT | Performed by: OBSTETRICS & GYNECOLOGY

## 2018-11-07 PROCEDURE — G8419 CALC BMI OUT NRM PARAM NOF/U: HCPCS | Performed by: FAMILY MEDICINE

## 2018-11-07 PROCEDURE — G8427 DOCREV CUR MEDS BY ELIG CLIN: HCPCS | Performed by: FAMILY MEDICINE

## 2018-11-07 PROCEDURE — G8482 FLU IMMUNIZE ORDER/ADMIN: HCPCS | Performed by: OBSTETRICS & GYNECOLOGY

## 2018-11-07 PROCEDURE — 1036F TOBACCO NON-USER: CPT | Performed by: FAMILY MEDICINE

## 2018-11-07 PROCEDURE — 99212 OFFICE O/P EST SF 10 MIN: CPT | Performed by: OBSTETRICS & GYNECOLOGY

## 2018-11-07 PROCEDURE — G8419 CALC BMI OUT NRM PARAM NOF/U: HCPCS | Performed by: OBSTETRICS & GYNECOLOGY

## 2018-11-07 PROCEDURE — 99214 OFFICE O/P EST MOD 30 MIN: CPT | Performed by: FAMILY MEDICINE

## 2018-11-07 PROCEDURE — G8482 FLU IMMUNIZE ORDER/ADMIN: HCPCS | Performed by: FAMILY MEDICINE

## 2018-11-07 RX ORDER — NAPROXEN 500 MG/1
500 TABLET ORAL 2 TIMES DAILY WITH MEALS
Qty: 60 TABLET | Refills: 3 | Status: SHIPPED | OUTPATIENT
Start: 2018-11-07

## 2018-11-07 RX ORDER — TRAMADOL HYDROCHLORIDE 50 MG/1
50 TABLET ORAL EVERY 8 HOURS PRN
Qty: 45 TABLET | Refills: 0 | Status: SHIPPED | OUTPATIENT
Start: 2018-11-07 | End: 2018-11-14

## 2018-11-07 ASSESSMENT — ENCOUNTER SYMPTOMS: SHORTNESS OF BREATH: 0

## 2018-11-07 NOTE — PROGRESS NOTES
problem. Objective:     /71   Pulse 87   Resp 16   Ht 5' (1.524 m)   Wt 130 lb 6.4 oz (59.1 kg)   LMP 11/01/2018   SpO2 94%   BMI 25.47 kg/m²     Physical Exam   Constitutional: She is oriented to person, place, and time. She appears well-developed. HENT:   Head: Normocephalic. Eyes: Conjunctivae and EOM are normal.   Cardiovascular: Normal heart sounds. No murmur heard. Pulmonary/Chest: Effort normal and breath sounds normal. No respiratory distress. She has no wheezes. Musculoskeletal:        Left shoulder: She exhibits decreased range of motion and tenderness. She exhibits no swelling, no crepitus, no deformity, no spasm, normal pulse and normal strength. Left shoulder: Unable to forward flex the arm above 90° due to pain, unable to abduct the arm above 90° due to pain, positive empty can test, pain on assessment of right shoulder power (power found to be normal at 5 out of 5 in all movements of the left shoulder), no swelling, sensation intact in the upper extremity   Neurological: She is alert and oriented to person, place, and time. Skin: She is not diaphoretic. Psychiatric: She has a normal mood and affect. Her behavior is normal. Judgment and thought content normal.       Assessment & Plan:      1. Strain of left shoulder, initial encounter  2. Impingement syndrome of left shoulder  Recommended PT, naproxen and tramadol as needed. Time given off work for the rest of the week. Recommended establishing with a workman's comp physician if she wishes to claim this as a work-related injury. She will be speaking with HR. Review of [place] does not show any aberrant prescription behavior.  - traMADol (ULTRAM) 50 MG tablet; Take 1 tablet by mouth every 8 hours as needed for Pain for up to 7 days. Take lowest dose possible to manage pain. Dispense: 45 tablet; Refill: 0  - naproxen (NAPROSYN) 500 MG tablet;  Take 1 tablet by mouth 2 times daily (with meals)  Dispense: 60 tablet;

## 2018-11-26 ENCOUNTER — TELEPHONE (OUTPATIENT)
Dept: OBGYN CLINIC | Age: 23
End: 2018-11-26

## 2018-11-26 NOTE — TELEPHONE ENCOUNTER
Magaly Aj called stating that she had passed a large blood clot on 11/17/18 at work and is concerned. No bleeding since then has  Nexplanon since 5/31/2018.

## 2018-11-26 NOTE — TELEPHONE ENCOUNTER
Called and discussed patient's symptoms. Discussed bleeding pattern for Nexplanon. Discussed Motrin 600mg every 6 hours during the days of heavy menses.  Pt to follow up if she would like to d/c nexplanon

## 2018-11-29 DIAGNOSIS — R30.9 PAINFUL URINATION: Primary | ICD-10-CM

## 2018-11-29 LAB — CYTOLOGY REPORT: NORMAL

## 2019-03-28 ENCOUNTER — OFFICE VISIT (OUTPATIENT)
Dept: FAMILY MEDICINE CLINIC | Age: 24
End: 2019-03-28
Payer: MEDICAID

## 2019-03-28 VITALS
RESPIRATION RATE: 16 BRPM | DIASTOLIC BLOOD PRESSURE: 68 MMHG | HEIGHT: 60 IN | BODY MASS INDEX: 25.91 KG/M2 | SYSTOLIC BLOOD PRESSURE: 107 MMHG | OXYGEN SATURATION: 98 % | HEART RATE: 80 BPM | WEIGHT: 132 LBS

## 2019-03-28 DIAGNOSIS — Z97.5 NEXPLANON IN PLACE: Primary | ICD-10-CM

## 2019-03-28 DIAGNOSIS — G44.209 TENSION-TYPE HEADACHE, NOT INTRACTABLE, UNSPECIFIED CHRONICITY PATTERN: ICD-10-CM

## 2019-03-28 PROCEDURE — 1036F TOBACCO NON-USER: CPT | Performed by: FAMILY MEDICINE

## 2019-03-28 PROCEDURE — 99213 OFFICE O/P EST LOW 20 MIN: CPT | Performed by: FAMILY MEDICINE

## 2019-03-28 PROCEDURE — G8482 FLU IMMUNIZE ORDER/ADMIN: HCPCS | Performed by: FAMILY MEDICINE

## 2019-03-28 PROCEDURE — G8419 CALC BMI OUT NRM PARAM NOF/U: HCPCS | Performed by: FAMILY MEDICINE

## 2019-03-28 PROCEDURE — G8427 DOCREV CUR MEDS BY ELIG CLIN: HCPCS | Performed by: FAMILY MEDICINE

## 2019-03-28 RX ORDER — BUTALBITAL AND ACETAMINOPHEN 25; 325 MG/1; MG/1
TABLET ORAL
Qty: 30 TABLET | Refills: 2 | Status: SHIPPED | OUTPATIENT
Start: 2019-03-28 | End: 2019-04-17

## 2019-03-28 ASSESSMENT — PATIENT HEALTH QUESTIONNAIRE - PHQ9
SUM OF ALL RESPONSES TO PHQ QUESTIONS 1-9: 0
1. LITTLE INTEREST OR PLEASURE IN DOING THINGS: 0
2. FEELING DOWN, DEPRESSED OR HOPELESS: 0
SUM OF ALL RESPONSES TO PHQ QUESTIONS 1-9: 0
SUM OF ALL RESPONSES TO PHQ9 QUESTIONS 1 & 2: 0

## 2019-03-28 ASSESSMENT — ENCOUNTER SYMPTOMS: SHORTNESS OF BREATH: 0

## 2019-04-10 ENCOUNTER — OFFICE VISIT (OUTPATIENT)
Dept: FAMILY MEDICINE CLINIC | Age: 24
End: 2019-04-10
Payer: MEDICAID

## 2019-04-10 VITALS
HEIGHT: 60 IN | WEIGHT: 140.2 LBS | OXYGEN SATURATION: 96 % | DIASTOLIC BLOOD PRESSURE: 68 MMHG | RESPIRATION RATE: 16 BRPM | HEART RATE: 76 BPM | SYSTOLIC BLOOD PRESSURE: 104 MMHG | BODY MASS INDEX: 27.52 KG/M2

## 2019-04-10 DIAGNOSIS — Q24.9 CONGENITAL HEART DEFECT: ICD-10-CM

## 2019-04-10 DIAGNOSIS — G89.29 CHRONIC MIDLINE LOW BACK PAIN WITHOUT SCIATICA: Primary | ICD-10-CM

## 2019-04-10 DIAGNOSIS — M54.50 CHRONIC MIDLINE LOW BACK PAIN WITHOUT SCIATICA: Primary | ICD-10-CM

## 2019-04-10 PROCEDURE — 1036F TOBACCO NON-USER: CPT | Performed by: FAMILY MEDICINE

## 2019-04-10 PROCEDURE — G8427 DOCREV CUR MEDS BY ELIG CLIN: HCPCS | Performed by: FAMILY MEDICINE

## 2019-04-10 PROCEDURE — 99213 OFFICE O/P EST LOW 20 MIN: CPT | Performed by: FAMILY MEDICINE

## 2019-04-10 PROCEDURE — G8419 CALC BMI OUT NRM PARAM NOF/U: HCPCS | Performed by: FAMILY MEDICINE

## 2019-04-10 RX ORDER — CYCLOBENZAPRINE HCL 5 MG
5 TABLET ORAL 3 TIMES DAILY PRN
Qty: 60 TABLET | Refills: 3 | Status: SHIPPED | OUTPATIENT
Start: 2019-04-10

## 2019-04-10 ASSESSMENT — ENCOUNTER SYMPTOMS
SHORTNESS OF BREATH: 0
BACK PAIN: 1

## 2019-04-10 NOTE — PROGRESS NOTES
Visit Information    Have you changed or started any medications since your last visit including any over-the-counter medicines, vitamins, or herbal medicines? no   Are you having any side effects from any of your medications? -  no  Have you stopped taking any of your medications? Is so, why? -  no    Have you seen any other physician or provider since your last visit? No  Have you had any other diagnostic tests since your last visit? No  Have you been seen in the emergency room and/or had an admission to a hospital since we last saw you? No  Have you had your routine dental cleaning in the past 6 months? no    Have you activated your [employer] account? If not, what are your barriers?  Yes     Patient Care Team:  Rigoberto Hammond MD as PCP - General (Family Medicine)  Rigoberto Hammond MD as PCP - [facility] Attributed Provider    Medical History Review  Past Medical, Family, and Social History reviewed and does not contribute to the patient presenting condition    Health Maintenance   Topic Date Due    Varicella Vaccine (1 of 2 - 13+ 2-dose series) 02/10/2006    HPV vaccine (1 - Female 3-dose series) 02/10/2008    Cervical cancer screen  11/07/2019    DTaP/Tdap/Td vaccine (2 - Td) 08/24/2024    Flu vaccine  Completed    HIV screen  Completed    Pneumococcal 0-64 years Vaccine  Aged Out

## 2019-04-10 NOTE — PROGRESS NOTES
Estelita Negron MD  [facility]  [address] [place] [id number]  Dept: [phone]    Porfirio Owen is a 30 y.o. female who presents today for hermedical conditions/complaints as noted below.   Porfirio Owen is here today c/o Back Pain and Finger Pain (left thumb)       HPI:     HPI    Back pain x few years, no radiation down the legs, no numbness or tingling in the legs  Works at [employer], pain gets worse after long shift, gets better with rest  Naproxen helps somewhat, lately has not been doing much  No fevers, night sweats, or weight loss; no bowel or bladder incontinence; no urinary retention; no numbness/weakness in the legs  No recent trauma or fall or injury    Patient Active Problem List   Diagnosis    Congenital heart defect, repaired at age 1   [facility] Chronic midline low back pain without sciatica    Pain in lateral portion of left knee    Lump of skin of back    HGSIL (high grade squamous intraepithelial lesion) on Pap smear of cervix    ANU 2-3    S/P LEEP 2/16/18    Nexplanon in place    Strain of left shoulder       Past Medical History:   Diagnosis Date    Chronic midline low back pain without sciatica 11/16/2017    Congenital heart defect     born w/ VSD, 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    HGSIL (high grade squamous intraepithelial lesion) on Pap smear of cervix 1/11/2018    History of frequent ear infections as a child    Nexplanon in place     5/31/18    Strain of left shoulder 11/7/2018    Vision abnormalities     Wears glasses      Past Surgical History:   Procedure Laterality Date    CARDIAC SURGERY      hole in heart at birth   [address]      2x    DENTAL SURGERY      wisdom teeth    LEEP  02/16/2018    top hat    [place] COLPOSCOPY,CERVIX W/ADJ VAGINA, CURETTAG N/A 2/16/2018    LEEP WITH TOP HAT performed by Bernice Darnell MD at [address] [place] [address] History   Problem Relation Age of Onset    Breast Cancer Other     Hypertension Maternal Grandmother     Cancer Maternal Grandmother         tongue    Diabetes Mother     Hypertension Mother     Breast Cancer Maternal Aunt     Heart Disease Son     Colon Cancer Neg Hx     Eclampsia Neg Hx     Ovarian Cancer Neg Hx      Labor Neg Hx     Spont Abortions Neg Hx     Stroke Neg Hx      Social History     Tobacco Use    Smoking status: Never Smoker    Smokeless tobacco: Never Used   Substance Use Topics    Alcohol use: No    Drug use: No       Current Outpatient Medications:     cyclobenzaprine (FLEXERIL) 5 MG tablet, Take 1 tablet by mouth 3 times daily as needed for Muscle spasms, Disp: 60 tablet, Rfl: 3    Butalbital-Acetaminophen (ALLZITAL)  MG TABS, 1 tab by mouth every 8 hours when necessary for headache, Disp: 30 tablet, Rfl: 2    naproxen (NAPROSYN) 500 MG tablet, Take 1 tablet by mouth 2 times daily (with meals), Disp: 60 tablet, Rfl: 3    omeprazole (PRILOSEC OTC) 20 MG tablet, Take 1 tablet by mouth daily, Disp: 30 tablet, Rfl: 3    guaiFENesin (MUCINEX) 600 MG extended release tablet, Take 1 tablet by mouth 2 times daily, Disp: 30 tablet, Rfl: 0    ondansetron (ZOFRAN) 8 MG tablet, TAKE ONE TABLET BY MOUTH EVERY 8 HOURS AS NEEDED FOR NAUSEA OR VOMITING, Disp: 30 tablet, Rfl: 0    albuterol sulfate HFA (VENTOLIN HFA) 108 (90 Base) MCG/ACT inhaler, Inhale 2 puffs into the lungs every 6 hours as needed for Wheezing, Disp: 1 Inhaler, Rfl: 0    fluticasone (FLONASE) 50 MCG/ACT nasal spray, 2 sprays by Nasal route daily, Disp: 1 Bottle, Rfl: 0    etonogestrel (NEXPLANON) 68 MG implant, 68 mg by Subdermal route once, Disp: , Rfl:     ibuprofen (ADVIL;MOTRIN) 800 MG tablet, Take 1 tablet by mouth every 6 hours as needed for Pain, Disp: 120 tablet, Rfl: 0    Subjective:     Review of Systems   Constitutional: Negative for fever. Respiratory: Negative for shortness of breath. Cardiovascular: Negative for chest pain. Musculoskeletal: Positive for back pain. Negative for gait problem and joint swelling. Objective:     /68   Pulse 76   Resp 16   Ht 5' (1.524 m)   Wt 140 lb 3.2 oz (63.6 kg)   SpO2 96%   BMI 27.38 kg/m²     Physical Exam   Constitutional: She appears well-developed. HENT:   Head: Normocephalic. Eyes: Conjunctivae and EOM are normal.   Cardiovascular: Normal heart sounds. No murmur heard. Pulmonary/Chest: Effort normal and breath sounds normal. No respiratory distress. She has no wheezes. Musculoskeletal:        Lumbar back: Normal. She exhibits normal range of motion, no bony tenderness, no swelling, no edema, no deformity and no spasm. No pain on straight leg raise bilaterally, no pain on palpation over the spinous processes or paraspinal muscles   Neurological: She is alert. Skin: She is not diaphoretic. Psychiatric: She has a normal mood and affect. Her behavior is normal. Judgment and thought content normal.       Assessment & Plan:      1. Chronic midline low back pain without sciatica  Proceed with muscle relaxant as needed for muscle spasm   Advised patient of drowsiness associated with use and to take first dose prior to bed, avoid driving if drowsy  Recommend back stretches  Recommended physical therapy  Monitor for worsening symptoms or red flags  Call office with concerns  - [facility] - [facility]  - XR LUMBAR SPINE (2-3 VIEWS); Future  - cyclobenzaprine (FLEXERIL) 5 MG tablet; Take 1 tablet by mouth 3 times daily as needed for Muscle spasms  Dispense: 60 tablet; Refill: 3    2. Congenital heart defect  Repaired at age 1, no residual issues    Call or return to clinic prn if these symptoms worsen or fail to improve as anticipated. I have reviewed the instructions with the patient, answering all questions to their satisfaction.     Electronically signed by Albin Aguilar MD on 4/10/2019 at 4:58 PM

## 2019-04-17 RX ORDER — BUTALBITAL, ACETAMINOPHEN AND CAFFEINE 50; 325; 40 MG/1; MG/1; MG/1
1 TABLET ORAL EVERY 6 HOURS PRN
Qty: 60 TABLET | Refills: 1 | Status: SHIPPED | OUTPATIENT
Start: 2019-04-17

## 2019-04-18 ENCOUNTER — HOSPITAL ENCOUNTER (OUTPATIENT)
Age: 24
Setting detail: SPECIMEN
Discharge: HOME | End: 2019-04-18
Payer: MEDICAID

## 2019-04-18 ENCOUNTER — OFFICE VISIT (OUTPATIENT)
Dept: FAMILY MEDICINE CLINIC | Age: 24
End: 2019-04-18
Payer: MEDICAID

## 2019-04-18 VITALS
BODY MASS INDEX: 27.48 KG/M2 | SYSTOLIC BLOOD PRESSURE: 111 MMHG | HEART RATE: 87 BPM | DIASTOLIC BLOOD PRESSURE: 73 MMHG | RESPIRATION RATE: 16 BRPM | HEIGHT: 60 IN | OXYGEN SATURATION: 98 % | WEIGHT: 140 LBS

## 2019-04-18 DIAGNOSIS — J06.9 VIRAL URI: Primary | ICD-10-CM

## 2019-04-18 PROCEDURE — G8427 DOCREV CUR MEDS BY ELIG CLIN: HCPCS | Performed by: FAMILY MEDICINE

## 2019-04-18 PROCEDURE — 99213 OFFICE O/P EST LOW 20 MIN: CPT | Performed by: FAMILY MEDICINE

## 2019-04-18 PROCEDURE — 1036F TOBACCO NON-USER: CPT | Performed by: FAMILY MEDICINE

## 2019-04-18 PROCEDURE — G8419 CALC BMI OUT NRM PARAM NOF/U: HCPCS | Performed by: FAMILY MEDICINE

## 2019-04-18 RX ORDER — GUAIFENESIN AND DEXTROMETHORPHAN HYDROBROMIDE 1200; 60 MG/1; MG/1
1 TABLET, EXTENDED RELEASE ORAL 2 TIMES DAILY PRN
Qty: 28 TABLET | Refills: 0 | Status: SHIPPED | OUTPATIENT
Start: 2019-04-18

## 2019-04-18 RX ORDER — FLUTICASONE PROPIONATE 50 MCG
2 SPRAY, SUSPENSION (ML) NASAL DAILY
Qty: 1 BOTTLE | Refills: 0 | Status: SHIPPED | OUTPATIENT
Start: 2019-04-18

## 2019-04-18 ASSESSMENT — ENCOUNTER SYMPTOMS
COUGH: 1
SHORTNESS OF BREATH: 0
BLOOD IN STOOL: 0
EYE PAIN: 0
SINUS COMPLAINT: 1
SINUS PRESSURE: 0
SORE THROAT: 1

## 2019-04-18 NOTE — PROGRESS NOTES
MD Leno Oliv[facility]  [address] [address]  Dept: [phone]    Vivian Varela is a 30 y.o. female who presents today for hermedical conditions/complaints as noted below. Vivian Varela is here today c/o Sinus Problem (x 3 days)       HPI:     Sinus Problem   This is a new problem. The problem is unchanged. There has been no fever. Associated symptoms include chills, congestion, coughing, headaches and a sore throat. Pertinent negatives include no diaphoresis, ear pain, shortness of breath or sinus pressure. Past treatments include oral decongestants. The treatment provided no relief.    Nasal pressure x 4 days, sore throat, congestion, eyes runny  Ears feel plugged  Using otc cold medicine    Patient Active Problem List   Diagnosis    Congenital heart defect, repaired at age 1    Chronic midline low back pain without sciatica    Pain in lateral portion of left knee    Lump of skin of back    HGSIL (high grade squamous intraepithelial lesion) on Pap smear of cervix    ANU 2-3    S/P LEEP 2/16/18    Nexplanon in place    Strain of left shoulder       Past Medical History:   Diagnosis Date    Chronic midline low back pain without sciatica 11/16/2017    Congenital heart defect     born w/ VSD, 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    HGSIL (high grade squamous intraepithelial lesion) on Pap smear of cervix 1/11/2018    History of frequent ear infections as a child    Nexplanon in place     5/31/18    Strain of left shoulder 11/7/2018    Vision abnormalities     Wears glasses      Past Surgical History:   Procedure Laterality Date    CARDIAC SURGERY      hole in heart at birth   [address]      2x    DENTAL SURGERY      wisdom teeth    LEEP  02/16/2018    top hat    [place] COLPOSCOPY,CERVIX W/ADJ VAGINA, CURETTAG N/A 2/16/2018    LEEP WITH TOP HAT performed by Phuong Keita MD at [address] [place] [address] History   Problem Relation Age of Onset    Breast Cancer Other     Hypertension Maternal Grandmother     Cancer Maternal Grandmother         tongue    Diabetes Mother     Hypertension Mother     Breast Cancer Maternal Aunt     Heart Disease Son     Colon Cancer Neg Hx     Eclampsia Neg Hx     Ovarian Cancer Neg Hx      Labor Neg Hx     Spont Abortions Neg Hx     Stroke Neg Hx      Social History     Tobacco Use    Smoking status: Never Smoker    Smokeless tobacco: Never Used   Substance Use Topics    Alcohol use: No    Drug use: No       Current Outpatient Medications:     fluticasone (FLONASE) 50 MCG/ACT nasal spray, 2 sprays by Nasal route daily, Disp: 1 Bottle, Rfl: 0    Dextromethorphan-guaiFENesin (MUCINEX DM MAXIMUM STRENGTH)  MG TB12, Take 1 tablet by mouth 2 times daily as needed (cough), Disp: 28 tablet, Rfl: 0    butalbital-acetaminophen-caffeine (FIORICET, ESGIC) -40 MG per tablet, Take 1 tablet by mouth every 6 hours as needed for Headaches, Disp: 60 tablet, Rfl: 1    cyclobenzaprine (FLEXERIL) 5 MG tablet, Take 1 tablet by mouth 3 times daily as needed for Muscle spasms, Disp: 60 tablet, Rfl: 3    naproxen (NAPROSYN) 500 MG tablet, Take 1 tablet by mouth 2 times daily (with meals), Disp: 60 tablet, Rfl: 3    omeprazole (PRILOSEC OTC) 20 MG tablet, Take 1 tablet by mouth daily, Disp: 30 tablet, Rfl: 3    guaiFENesin (MUCINEX) 600 MG extended release tablet, Take 1 tablet by mouth 2 times daily, Disp: 30 tablet, Rfl: 0    ondansetron (ZOFRAN) 8 MG tablet, TAKE ONE TABLET BY MOUTH EVERY 8 HOURS AS NEEDED FOR NAUSEA OR VOMITING, Disp: 30 tablet, Rfl: 0    albuterol sulfate HFA (VENTOLIN HFA) 108 (90 Base) MCG/ACT inhaler, Inhale 2 puffs into the lungs every 6 hours as needed for Wheezing, Disp: 1 Inhaler, Rfl: 0    etonogestrel (NEXPLANON) 68 MG implant, 68 mg by Subdermal route once, Disp: , Rfl:     ibuprofen (ADVIL;MOTRIN) 800 MG tablet, Take 1 tablet by mouth every 6 hours as needed for Pain, Disp: 120 tablet, Rfl: 0    Subjective:     Review of Systems   Constitutional: Positive for chills. Negative for appetite change, diaphoresis, fever and unexpected weight change. HENT: Positive for congestion, hearing loss and sore throat. Negative for ear discharge, ear pain and sinus pressure. Eyes: Negative for pain. Respiratory: Positive for cough. Negative for shortness of breath. Cardiovascular: Negative for chest pain and leg swelling. Gastrointestinal: Negative for blood in stool. Musculoskeletal: Negative for gait problem. Skin: Negative for pallor. Neurological: Positive for headaches. Negative for seizures. Psychiatric/Behavioral: Negative for dysphoric mood and suicidal ideas. Objective:     /73   Pulse 87   Resp 16   Ht 5' (1.524 m)   Wt 140 lb (63.5 kg)   SpO2 98%   BMI 27.34 kg/m²     Physical Exam   Constitutional: She appears well-developed. HENT:   Head: Normocephalic. Mouth/Throat: No oropharyngeal exudate. GERMAN at bilateral TMs, no erythema or bulging     No pain on palpation over the sinuses   Eyes: Conjunctivae and EOM are normal.   Neck: Normal range of motion. Cardiovascular: Normal heart sounds. No murmur heard. Pulmonary/Chest: Effort normal and breath sounds normal. No respiratory distress. She has no wheezes. Lymphadenopathy:     She has no cervical adenopathy. Neurological: She is alert. Skin: She is not diaphoretic. Psychiatric: She has a normal mood and affect. Her behavior is normal. Judgment and thought content normal.       Assessment & Plan:      1. Viral URI, GERMAN  Recommended rest and hydration. Recommended Flonase, Tylenol as needed, NeilMed sinus rinse. Throat swab sent for culture. F/U if sx don't improve or worsen. - fluticasone (FLONASE) 50 MCG/ACT nasal spray; 2 sprays by Nasal route daily  Dispense: 1 Bottle;  Refill: 0  - Dextromethorphan-guaiFENesin ([address] DM MAXIMUM

## 2019-04-18 NOTE — PROGRESS NOTES
Visit Information    Have you changed or started any medications since your last visit including any over-the-counter medicines, vitamins, or herbal medicines? no   Are you having any side effects from any of your medications? -  no  Have you stopped taking any of your medications? Is so, why? -  no    Have you seen any other physician or provider since your last visit? No  Have you had any other diagnostic tests since your last visit? No  Have you been seen in the emergency room and/or had an admission to a hospital since we last saw you? No  Have you had your routine dental cleaning in the past 6 months? no    Have you activated your retsCloud account? If not, what are your barriers?  Yes     Patient Care Team:  Michelle Luevano MD as PCP - General (Family Medicine)  Michelle Luevano MD as PCP - [facility] Attributed Provider    Medical History Review  Past Medical, Family, and Social History reviewed and does not contribute to the patient presenting condition    Health Maintenance   Topic Date Due    Varicella Vaccine (1 of 2 - 13+ 2-dose series) 02/10/2006    HPV vaccine (1 - Female 3-dose series) 02/10/2008    Cervical cancer screen  11/07/2019    DTaP/Tdap/Td vaccine (2 - Td) 08/24/2024    Flu vaccine  Completed    HIV screen  Completed    Pneumococcal 0-64 years Vaccine  Aged Out

## 2019-04-20 LAB
CULTURE: NORMAL
CULTURE: NORMAL
Lab: NORMAL
SPECIMEN DESCRIPTION: NORMAL

## 2019-06-11 ENCOUNTER — OFFICE VISIT (OUTPATIENT)
Dept: FAMILY MEDICINE CLINIC | Age: 24
End: 2019-06-11
Payer: MEDICAID

## 2019-06-11 VITALS
OXYGEN SATURATION: 96 % | DIASTOLIC BLOOD PRESSURE: 81 MMHG | SYSTOLIC BLOOD PRESSURE: 116 MMHG | WEIGHT: 146.8 LBS | HEIGHT: 61 IN | HEART RATE: 90 BPM | BODY MASS INDEX: 27.72 KG/M2 | RESPIRATION RATE: 16 BRPM

## 2019-06-11 DIAGNOSIS — D17.79 LIPOMA OF OTHER SPECIFIED SITES: Primary | ICD-10-CM

## 2019-06-11 PROCEDURE — 1036F TOBACCO NON-USER: CPT | Performed by: FAMILY MEDICINE

## 2019-06-11 PROCEDURE — G8427 DOCREV CUR MEDS BY ELIG CLIN: HCPCS | Performed by: FAMILY MEDICINE

## 2019-06-11 PROCEDURE — 99213 OFFICE O/P EST LOW 20 MIN: CPT | Performed by: FAMILY MEDICINE

## 2019-06-11 PROCEDURE — G8419 CALC BMI OUT NRM PARAM NOF/U: HCPCS | Performed by: FAMILY MEDICINE

## 2019-06-11 ASSESSMENT — ENCOUNTER SYMPTOMS: SHORTNESS OF BREATH: 0

## 2019-06-11 NOTE — PROGRESS NOTES
MD Anthony Pérez [facility]  [address] [address]  Dept: [phone]    Ledy Nicholson is a 30 y.o. female who presents today for hermedical conditions/complaints as noted below.   Ledy Nicholson is here today c/o Mass       HPI:     HPI    Bump on R mid back x 1 year, getting bigger and now more bothersome as it is starting to ache, she would like it removed  No weight loss, no fevers, no night sweats    Patient Active Problem List   Diagnosis    Congenital heart defect, repaired at age 1   [facility] Chronic midline low back pain without sciatica    Pain in lateral portion of left knee    Lump of skin of back    HGSIL (high grade squamous intraepithelial lesion) on Pap smear of cervix    ANU 2-3    S/P LEEP 18    Nexplanon in place    Strain of left shoulder       Past Medical History:   Diagnosis Date    Chronic midline low back pain without sciatica 2017    Congenital heart defect     born w/ VSD, 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    HGSIL (high grade squamous intraepithelial lesion) on Pap smear of cervix 2018    History of frequent ear infections as a child    Nexplanon in place     18    Strain of left shoulder 2018    Vision abnormalities     Wears glasses      Past Surgical History:   Procedure Laterality Date    CARDIAC SURGERY      hole in heart at birth   [facility]  SECTION      2x    DENTAL SURGERY      wisdom teeth    LEEP  2018    top hat    [place] COLPOSCOPY,CERVIX W/ADJ VAGINA, CURETTAG N/A 2018    LEEP WITH TOP HAT performed by Dewey Nicole MD at [address] [place] [address] History   Problem Relation Age of Onset    Breast Cancer Other     Hypertension Maternal Grandmother     Cancer Maternal Grandmother         tongue    Diabetes Mother     Hypertension Mother     Breast Cancer Maternal Aunt     Heart Disease Son     Colon Cancer Neg Hx     Eclampsia Neg Hx  Ovarian Cancer Neg Hx      Labor Neg Hx     Spont Abortions Neg Hx     Stroke Neg Hx      Social History     Tobacco Use    Smoking status: Never Smoker    Smokeless tobacco: Never Used   Substance Use Topics    Alcohol use: No    Drug use: No       Current Outpatient Medications:     fluticasone (FLONASE) 50 MCG/ACT nasal spray, 2 sprays by Nasal route daily, Disp: 1 Bottle, Rfl: 0    Dextromethorphan-guaiFENesin (MUCINEX DM MAXIMUM STRENGTH)  MG TB12, Take 1 tablet by mouth 2 times daily as needed (cough), Disp: 28 tablet, Rfl: 0    butalbital-acetaminophen-caffeine (FIORICET, ESGIC) -40 MG per tablet, Take 1 tablet by mouth every 6 hours as needed for Headaches, Disp: 60 tablet, Rfl: 1    cyclobenzaprine (FLEXERIL) 5 MG tablet, Take 1 tablet by mouth 3 times daily as needed for Muscle spasms, Disp: 60 tablet, Rfl: 3    naproxen (NAPROSYN) 500 MG tablet, Take 1 tablet by mouth 2 times daily (with meals), Disp: 60 tablet, Rfl: 3    omeprazole (PRILOSEC OTC) 20 MG tablet, Take 1 tablet by mouth daily, Disp: 30 tablet, Rfl: 3    guaiFENesin (MUCINEX) 600 MG extended release tablet, Take 1 tablet by mouth 2 times daily, Disp: 30 tablet, Rfl: 0    ondansetron (ZOFRAN) 8 MG tablet, TAKE ONE TABLET BY MOUTH EVERY 8 HOURS AS NEEDED FOR NAUSEA OR VOMITING, Disp: 30 tablet, Rfl: 0    albuterol sulfate HFA (VENTOLIN HFA) 108 (90 Base) MCG/ACT inhaler, Inhale 2 puffs into the lungs every 6 hours as needed for Wheezing, Disp: 1 Inhaler, Rfl: 0    etonogestrel (NEXPLANON) 68 MG implant, 68 mg by Subdermal route once, Disp: , Rfl:     ibuprofen (ADVIL;MOTRIN) 800 MG tablet, Take 1 tablet by mouth every 6 hours as needed for Pain, Disp: 120 tablet, Rfl: 0    Subjective:     Review of Systems   Constitutional: Negative for fever. Respiratory: Negative for shortness of breath. Cardiovascular: Negative for chest pain.        Objective:     /81   Pulse 90   Resp 16   Ht 5' 1\" (1.549 m)   Wt 146 lb 12.8 oz (66.6 kg)   SpO2 96%   BMI 27.74 kg/m²     Physical Exam   Constitutional: She appears well-developed. HENT:   Head: Normocephalic. Mouth/Throat: No oropharyngeal exudate. Eyes: EOM are normal. Right eye exhibits no discharge. Left eye exhibits no discharge. No scleral icterus. Pulmonary/Chest: Effort normal. No respiratory distress. Neurological: She is alert. Skin: She is not diaphoretic. R mid back: 2 cm nonmobile nontender hardened lump, no skin color changes   Psychiatric: She has a normal mood and affect. Her behavior is normal. Judgment and thought content normal.       Assessment & Plan:      1. Lipoma of other specified sites  Lesion consistent with lipoma, advised on benign nature of this, she would like to have it removed as it is aching, referral made to plastics, ultrasound ordered to better characterize the lesion  - US CHEST INCLUDING MEDIASTINUM; Future  - [facility]) - BARBIE Motley MD, Plastic Surgery, [place]    Call or return to clinic prn if these symptoms worsen or fail to improve as anticipated. I have reviewed the instructions with the patient, answering all questions to their satisfaction.     Electronically signed by Lisa Bernal MD on 6/11/2019 at 1:32 PM

## 2019-12-30 ENCOUNTER — HOSPITAL ENCOUNTER (EMERGENCY)
Age: 24
Discharge: HOME | End: 2019-12-30
Attending: EMERGENCY MEDICINE

## 2019-12-30 VITALS
WEIGHT: 146 LBS | DIASTOLIC BLOOD PRESSURE: 79 MMHG | SYSTOLIC BLOOD PRESSURE: 122 MMHG | OXYGEN SATURATION: 97 % | TEMPERATURE: 97.3 F | BODY MASS INDEX: 27.59 KG/M2 | RESPIRATION RATE: 17 BRPM | HEART RATE: 91 BPM

## 2019-12-30 PROCEDURE — 6370000000 HC RX 637 (ALT 250 FOR IP): Performed by: EMERGENCY MEDICINE

## 2019-12-30 PROCEDURE — 99282 EMERGENCY DEPT VISIT SF MDM: CPT

## 2019-12-30 RX ORDER — ACETAMINOPHEN 325 MG/1
650 TABLET ORAL ONCE
Status: COMPLETED | OUTPATIENT
Start: 2019-12-30 | End: 2019-12-30

## 2019-12-30 RX ORDER — ACETAMINOPHEN 500 MG
1000 TABLET ORAL EVERY 8 HOURS PRN
Qty: 30 TABLET | Refills: 0 | Status: SHIPPED | OUTPATIENT
Start: 2019-12-30

## 2019-12-30 RX ORDER — AMOXICILLIN AND CLAVULANATE POTASSIUM 875; 125 MG/1; MG/1
1 TABLET, FILM COATED ORAL EVERY 12 HOURS SCHEDULED
Status: DISCONTINUED | OUTPATIENT
Start: 2019-12-30 | End: 2019-12-30 | Stop reason: HOSPADM

## 2019-12-30 RX ORDER — AMOXICILLIN AND CLAVULANATE POTASSIUM 875; 125 MG/1; MG/1
1 TABLET, FILM COATED ORAL 2 TIMES DAILY
Qty: 14 TABLET | Refills: 0 | Status: SHIPPED | OUTPATIENT
Start: 2019-12-30 | End: 2020-01-06

## 2019-12-30 RX ORDER — IBUPROFEN 800 MG/1
800 TABLET ORAL ONCE
Status: COMPLETED | OUTPATIENT
Start: 2019-12-30 | End: 2019-12-30

## 2019-12-30 RX ORDER — IBUPROFEN 800 MG/1
800 TABLET ORAL EVERY 8 HOURS PRN
Qty: 30 TABLET | Refills: 0 | Status: SHIPPED | OUTPATIENT
Start: 2019-12-30

## 2019-12-30 RX ADMIN — IBUPROFEN 800 MG: 800 TABLET, FILM COATED ORAL at 08:11

## 2019-12-30 RX ADMIN — ACETAMINOPHEN 650 MG: 325 TABLET ORAL at 08:11

## 2019-12-30 RX ADMIN — AMOXICILLIN AND CLAVULANATE POTASSIUM 1 TABLET: 875; 125 TABLET, FILM COATED ORAL at 08:11

## 2019-12-30 ASSESSMENT — ENCOUNTER SYMPTOMS
SHORTNESS OF BREATH: 0
RHINORRHEA: 1
COUGH: 1
SORE THROAT: 1

## 2019-12-30 ASSESSMENT — PAIN DESCRIPTION - LOCATION: LOCATION: EAR

## 2019-12-30 ASSESSMENT — PAIN DESCRIPTION - ORIENTATION: ORIENTATION: RIGHT;LEFT

## 2019-12-30 ASSESSMENT — PAIN DESCRIPTION - DESCRIPTORS: DESCRIPTORS: ACHING

## 2019-12-30 ASSESSMENT — PAIN SCALES - GENERAL: PAINLEVEL_OUTOF10: 3

## 2019-12-30 ASSESSMENT — PAIN DESCRIPTION - ONSET: ONSET: SUDDEN

## 2019-12-30 ASSESSMENT — PAIN DESCRIPTION - PAIN TYPE: TYPE: ACUTE PAIN

## 2019-12-30 ASSESSMENT — PAIN DESCRIPTION - FREQUENCY: FREQUENCY: CONTINUOUS

## 2019-12-30 NOTE — ED PROVIDER NOTES
Emergency Medicine Attending Note    I have seen and examined the patient in conjunction with the Resident/MLP. Please see my key portion documented:      I agree with the assessment and plan as discussed with Dr. Srini Montanez. Electronically signed:  Amy Alfaro M.D.           Thuan Fong MD  12/30/19 0717

## 2019-12-30 NOTE — ED PROVIDER NOTES
[facility] ED  Emergency Department Encounter  Emergency Medicine Resident     Patient Name: Sherrell Morse  MRN: [id number]  [place]: 1995  Date of evaluation: 19  PCP:  Jocelyne Blackmon MD    [address] [place] [address]       Chief Complaint   Patient presents with    Otalgia       HISTORY OF PRESENT ILLNESS  (Location/Symptom, Timing/Onset, Context/Setting, Quality, Duration, Modifying Factors, Severity.)      Sherrell Morse is a 30 y.o. female who presents with feeling unwell for the past 2 weeks. Patient reports that she has had rhinorrhea, sore throat, cough, bilateral ear pain, and difficulty hearing. Patient denies fevers, shortness of breath, chest pain. PAST MEDICAL / SURGICAL / SOCIAL / FAMILY HISTORY      has a past medical history of Chronic midline low back pain without sciatica, Congenital heart defect, HGSIL (high grade squamous intraepithelial lesion) on Pap smear of cervix, History of frequent ear infections, Nexplanon in place, Strain of left shoulder, Vision abnormalities, and Wears glasses. has a past surgical history that includes Cardiac surgery;  section; Dental surgery; LEEP (2018); and pr colposcopy,cervix w/adj vagina, curettag (N/A, 2018).     Social History     Socioeconomic History    Marital status: Single     Spouse name: Not on file    Number of children: Not on file    Years of education: Not on file    Highest education level: Not on file   Occupational History    Not on file   Social Needs    Financial resource strain: Not on file    Food insecurity:     Worry: Not on file     Inability: Not on file    Transportation needs:     Medical: Not on file     Non-medical: Not on file   Tobacco Use    Smoking status: Never Smoker    Smokeless tobacco: Never Used   Substance and Sexual Activity    Alcohol use: No    Drug use: No    Sexual activity: Yes     Partners: Male     Birth control/protection: Implant     Comment: nexplanon 18   Lifestyle    Physical activity:     Days per week: Not on file     Minutes per session: Not on file    Stress: Not on file   Relationships    Social connections:     Talks on phone: Not on file     Gets together: Not on file     Attends Jewish service: Not on file     Active member of club or organization: Not on file     Attends meetings of clubs or organizations: Not on file     Relationship status: Not on file    Intimate partner violence:     Fear of current or ex partner: Not on file     Emotionally abused: Not on file     Physically abused: Not on file     Forced sexual activity: Not on file   Other Topics Concern    Not on file   Social History Narrative    Not on file       Family History   Problem Relation Age of Onset    Breast Cancer Other     Hypertension Maternal Grandmother     Cancer Maternal Grandmother         tongue    Diabetes Mother     Hypertension Mother     Breast Cancer Maternal Aunt     Heart Disease Son     Colon Cancer Neg Hx     Eclampsia Neg Hx     Ovarian Cancer Neg Hx      Labor Neg Hx     Spont Abortions Neg Hx     Stroke Neg Hx        Allergies:  Patient has no known allergies. Home Medications:  Prior to Admission medications    Medication Sig Start Date End Date Taking?  Authorizing Provider   amoxicillin-clavulanate (AUGMENTIN) 875-125 MG per tablet Take 1 tablet by mouth 2 times daily for 7 days 19 Yes Richard Karimi DO   acetaminophen (TYLENOL) 500 MG tablet Take 2 tablets by mouth every 8 hours as needed for Pain 19  Yes Richard Karimi DO   ibuprofen (ADVIL;MOTRIN) 800 MG tablet Take 1 tablet by mouth every 8 hours as needed for Pain 19  Yes Richard Karimi DO   fluticasone (FLONASE) 50 MCG/ACT nasal spray 2 sprays by Nasal route daily 19   Aaliyah Deng MD   Dextromethorphan-guaiFENesin (MUCINEX DM MAXIMUM STRENGTH)  MG TB12 Take 1 tablet by mouth 2 times daily as needed (cough) 19 discharge. PROCEDURES:  None    CONSULTS:  None    CRITICAL CARE:  Please see attending physician note. FINAL IMPRESSION      1.  Upper respiratory tract infection, unspecified type          DISPOSITION / PLAN     DISPOSITION Decision To Discharge 12/30/2019 07:59:18 AM    PATIENT REFERRED TO:  MD Luisa [address]  [address] [facility]  [phone]    Schedule an appointment as soon as possible for a visit       Shannon Domingo MD  [address] [address]  [phone]    Schedule an appointment as soon as possible for a visit   for ear follow up      DISCHARGE MEDICATIONS:  New Prescriptions    ACETAMINOPHEN (TYLENOL) 500 MG TABLET    Take 2 tablets by mouth every 8 hours as needed for Pain    AMOXICILLIN-CLAVULANATE (AUGMENTIN) 875-125 MG PER TABLET    Take 1 tablet by mouth 2 times daily for 7 days    IBUPROFEN (ADVIL;MOTRIN) 800 MG TABLET    Take 1 tablet by mouth every 8 hours as needed for Pain       Cami Nava DO  Emergency Medicine Resident    (Please note that portions ofthis note were completed with a voice recognition program.  Efforts were made to edit the dictations but occasionally words are mis-transcribed.)       Cami Nava DO  Resident  12/30/19 0823

## 2024-06-26 NOTE — PROGRESS NOTES
Dr Dee Court notified for signout Returned call to pt, She said she thought she was returning a call to me. But I had already talked to her and scheduled an appt with Karla Herrera for her. Pt had no other questions or concerns and thanked me for calling her.  ----- Message from Yamile Waddell sent at 6/26/2024  1:31 PM CDT -----  Type:  Patient Returning Call    Who Called:pt      Who Left Message for Patient:Caron VELASCO    Does the patient know what this is regarding?:yes    Would the patient rather a call back or a response via [facility]? Call back    Best Call Back Number:[phone]      Additional Information:      Please call Back to advise. Thanks!

## (undated) DEVICE — TUBING, SUCTION, 9/32" X 20', STRAIGHT: Brand: MEDLINE INDUSTRIES, INC.

## (undated) DEVICE — PENCIL ES L3M BTTN SWCH HOLSTER W/ BLDE ELECTRD EDGE

## (undated) DEVICE — ELECTRODE ES L11CM L15XW20MM BLU SAFE-T-GAUGE LOOP RND

## (undated) DEVICE — SMOKE EVACUATION TUBING WITH 7/8 IN TO 1/4 IN REDUCER: Brand: BUFFALO FILTER

## (undated) DEVICE — APPLICATOR FIBER TIP 16 IN CELLULOSE HD SWAB CHEVRON SCPET

## (undated) DEVICE — ELECTRODE ARTHSCP 15X11MM SHFT 11CM MPLR LOOP GRN DISP W/

## (undated) DEVICE — GOWN,AURORA,NONREINFORCED,LARGE: Brand: MEDLINE

## (undated) DEVICE — EXTENDER ELECTRD L11CM SHFT STR

## (undated) DEVICE — SHEET DRAPE FULL 70X100

## (undated) DEVICE — CONTROL SYRINGE LUER-LOCK TIP: Brand: MONOJECT

## (undated) DEVICE — NEEDLE SPNL 22GA L3.5IN BLK HUB S STL REG WALL FIT STYL W/

## (undated) DEVICE — ELECTRODE ES L11CM DIA5MM BALL SHFT RED DISP UTAHLOOP

## (undated) DEVICE — SVMMC GYN MIN PK

## (undated) DEVICE — GLOVE ORANGE PI 7   MSG9070

## (undated) DEVICE — GLOVE SURG SZ 65 THK91MIL LTX FREE SYN POLYISOPRENE